# Patient Record
Sex: MALE | Race: WHITE | NOT HISPANIC OR LATINO | Employment: OTHER | URBAN - METROPOLITAN AREA
[De-identification: names, ages, dates, MRNs, and addresses within clinical notes are randomized per-mention and may not be internally consistent; named-entity substitution may affect disease eponyms.]

---

## 2022-02-20 ENCOUNTER — TELEPHONE (OUTPATIENT)
Dept: BEHAVIORAL HEALTH | Facility: CLINIC | Age: 72
End: 2022-02-20

## 2022-02-20 ENCOUNTER — HOSPITAL ENCOUNTER (INPATIENT)
Facility: CLINIC | Age: 72
LOS: 3 days | Discharge: PSYCHIATRIC OR SUBSTANCE USE FACILITY | DRG: 897 | End: 2022-02-23
Attending: EMERGENCY MEDICINE | Admitting: PSYCHIATRY & NEUROLOGY
Payer: MEDICARE

## 2022-02-20 DIAGNOSIS — F13.10 BENZODIAZEPINE ABUSE (H): ICD-10-CM

## 2022-02-20 DIAGNOSIS — F10.29 ALCOHOL DEPENDENCE WITH UNSPECIFIED ALCOHOL-INDUCED DISORDER (H): ICD-10-CM

## 2022-02-20 DIAGNOSIS — Z20.822 CONTACT WITH AND (SUSPECTED) EXPOSURE TO COVID-19: ICD-10-CM

## 2022-02-20 PROBLEM — F19.139 SUBSTANCE ABUSE WITHDRAWAL WITH COMPLICATION (H): Status: ACTIVE | Noted: 2022-02-20

## 2022-02-20 LAB
ALBUMIN SERPL-MCNC: 3.3 G/DL (ref 3.4–5)
ALBUMIN UR-MCNC: 30 MG/DL
ALP SERPL-CCNC: 108 U/L (ref 40–150)
ALT SERPL W P-5'-P-CCNC: 43 U/L (ref 0–70)
AMPHETAMINES UR QL SCN: ABNORMAL
ANION GAP SERPL CALCULATED.3IONS-SCNC: 5 MMOL/L (ref 3–14)
APPEARANCE UR: CLEAR
AST SERPL W P-5'-P-CCNC: 35 U/L (ref 0–45)
BARBITURATES UR QL: ABNORMAL
BASOPHILS # BLD AUTO: 0.1 10E3/UL (ref 0–0.2)
BASOPHILS NFR BLD AUTO: 1 %
BENZODIAZ UR QL: ABNORMAL
BILIRUB SERPL-MCNC: 0.6 MG/DL (ref 0.2–1.3)
BILIRUB UR QL STRIP: NEGATIVE
BUN SERPL-MCNC: 41 MG/DL (ref 7–30)
CALCIUM SERPL-MCNC: 9.4 MG/DL (ref 8.5–10.1)
CANNABINOIDS UR QL SCN: ABNORMAL
CHLORIDE BLD-SCNC: 107 MMOL/L (ref 94–109)
CO2 SERPL-SCNC: 28 MMOL/L (ref 20–32)
COCAINE UR QL: ABNORMAL
COLOR UR AUTO: ABNORMAL
CREAT SERPL-MCNC: 1.94 MG/DL (ref 0.66–1.25)
EOSINOPHIL # BLD AUTO: 0.1 10E3/UL (ref 0–0.7)
EOSINOPHIL NFR BLD AUTO: 2 %
ERYTHROCYTE [DISTWIDTH] IN BLOOD BY AUTOMATED COUNT: 11.6 % (ref 10–15)
ETHANOL SERPL-MCNC: <0.01 G/DL
GFR SERPL CREATININE-BSD FRML MDRD: 37 ML/MIN/1.73M2
GLUCOSE BLD-MCNC: 123 MG/DL (ref 70–99)
GLUCOSE UR STRIP-MCNC: NEGATIVE MG/DL
HCT VFR BLD AUTO: 40.7 % (ref 40–53)
HGB BLD-MCNC: 13.7 G/DL (ref 13.3–17.7)
HGB UR QL STRIP: ABNORMAL
HYALINE CASTS: 1 /LPF
IMM GRANULOCYTES # BLD: 0.1 10E3/UL
IMM GRANULOCYTES NFR BLD: 1 %
KETONES UR STRIP-MCNC: NEGATIVE MG/DL
LEUKOCYTE ESTERASE UR QL STRIP: NEGATIVE
LYMPHOCYTES # BLD AUTO: 1.2 10E3/UL (ref 0.8–5.3)
LYMPHOCYTES NFR BLD AUTO: 19 %
MCH RBC QN AUTO: 31.8 PG (ref 26.5–33)
MCHC RBC AUTO-ENTMCNC: 33.7 G/DL (ref 31.5–36.5)
MCV RBC AUTO: 94 FL (ref 78–100)
MONOCYTES # BLD AUTO: 0.7 10E3/UL (ref 0–1.3)
MONOCYTES NFR BLD AUTO: 11 %
MUCOUS THREADS #/AREA URNS LPF: PRESENT /LPF
NEUTROPHILS # BLD AUTO: 4.3 10E3/UL (ref 1.6–8.3)
NEUTROPHILS NFR BLD AUTO: 66 %
NITRATE UR QL: NEGATIVE
NRBC # BLD AUTO: 0 10E3/UL
NRBC BLD AUTO-RTO: 0 /100
OPIATES UR QL SCN: ABNORMAL
PH UR STRIP: 5.5 [PH] (ref 5–7)
PLATELET # BLD AUTO: 233 10E3/UL (ref 150–450)
POTASSIUM BLD-SCNC: 4 MMOL/L (ref 3.4–5.3)
PROT SERPL-MCNC: 7 G/DL (ref 6.8–8.8)
RBC # BLD AUTO: 4.31 10E6/UL (ref 4.4–5.9)
RBC URINE: <1 /HPF
SARS-COV-2 RNA RESP QL NAA+PROBE: NEGATIVE
SODIUM SERPL-SCNC: 140 MMOL/L (ref 133–144)
SP GR UR STRIP: 1.02 (ref 1–1.03)
SQUAMOUS EPITHELIAL: <1 /HPF
UROBILINOGEN UR STRIP-MCNC: NORMAL MG/DL
WBC # BLD AUTO: 6.4 10E3/UL (ref 4–11)
WBC URINE: <1 /HPF

## 2022-02-20 PROCEDURE — 80307 DRUG TEST PRSMV CHEM ANLYZR: CPT | Performed by: EMERGENCY MEDICINE

## 2022-02-20 PROCEDURE — 80053 COMPREHEN METABOLIC PANEL: CPT | Performed by: EMERGENCY MEDICINE

## 2022-02-20 PROCEDURE — 250N000013 HC RX MED GY IP 250 OP 250 PS 637: Performed by: PSYCHIATRY & NEUROLOGY

## 2022-02-20 PROCEDURE — 99284 EMERGENCY DEPT VISIT MOD MDM: CPT | Performed by: EMERGENCY MEDICINE

## 2022-02-20 PROCEDURE — 82077 ASSAY SPEC XCP UR&BREATH IA: CPT | Performed by: EMERGENCY MEDICINE

## 2022-02-20 PROCEDURE — C9803 HOPD COVID-19 SPEC COLLECT: HCPCS | Performed by: EMERGENCY MEDICINE

## 2022-02-20 PROCEDURE — 128N000004 HC R&B CD ADULT

## 2022-02-20 PROCEDURE — 87635 SARS-COV-2 COVID-19 AMP PRB: CPT | Performed by: EMERGENCY MEDICINE

## 2022-02-20 PROCEDURE — 99285 EMERGENCY DEPT VISIT HI MDM: CPT | Performed by: EMERGENCY MEDICINE

## 2022-02-20 PROCEDURE — HZ2ZZZZ DETOXIFICATION SERVICES FOR SUBSTANCE ABUSE TREATMENT: ICD-10-PCS | Performed by: EMERGENCY MEDICINE

## 2022-02-20 PROCEDURE — 36415 COLL VENOUS BLD VENIPUNCTURE: CPT | Performed by: EMERGENCY MEDICINE

## 2022-02-20 PROCEDURE — 81001 URINALYSIS AUTO W/SCOPE: CPT | Performed by: EMERGENCY MEDICINE

## 2022-02-20 PROCEDURE — 82040 ASSAY OF SERUM ALBUMIN: CPT | Performed by: EMERGENCY MEDICINE

## 2022-02-20 PROCEDURE — 85025 COMPLETE CBC W/AUTO DIFF WBC: CPT | Performed by: EMERGENCY MEDICINE

## 2022-02-20 RX ORDER — MULTIPLE VITAMINS W/ MINERALS TAB 9MG-400MCG
1 TAB ORAL DAILY
Status: DISCONTINUED | OUTPATIENT
Start: 2022-02-21 | End: 2022-02-23 | Stop reason: HOSPADM

## 2022-02-20 RX ORDER — CHOLECALCIFEROL (VITAMIN D3) 50 MCG
1 TABLET ORAL DAILY
Status: ON HOLD | COMMUNITY
End: 2022-02-23

## 2022-02-20 RX ORDER — PHENOBARBITAL 32.4 MG/1
32.4 TABLET ORAL 3 TIMES DAILY
Status: DISCONTINUED | OUTPATIENT
Start: 2022-02-21 | End: 2022-02-21

## 2022-02-20 RX ORDER — ATENOLOL 50 MG/1
50 TABLET ORAL DAILY PRN
Status: DISCONTINUED | OUTPATIENT
Start: 2022-02-20 | End: 2022-02-23 | Stop reason: HOSPADM

## 2022-02-20 RX ORDER — TAMSULOSIN HYDROCHLORIDE 0.4 MG/1
1.2 CAPSULE ORAL AT BEDTIME
Status: DISCONTINUED | OUTPATIENT
Start: 2022-02-20 | End: 2022-02-23 | Stop reason: HOSPADM

## 2022-02-20 RX ORDER — ROSUVASTATIN CALCIUM 20 MG/1
40 TABLET, COATED ORAL DAILY
Status: DISCONTINUED | OUTPATIENT
Start: 2022-02-21 | End: 2022-02-23 | Stop reason: HOSPADM

## 2022-02-20 RX ORDER — FUROSEMIDE 40 MG
40 TABLET ORAL DAILY
Status: DISCONTINUED | OUTPATIENT
Start: 2022-02-21 | End: 2022-02-23 | Stop reason: HOSPADM

## 2022-02-20 RX ORDER — METOPROLOL TARTRATE 25 MG/1
25 TABLET, FILM COATED ORAL 2 TIMES DAILY
Status: DISCONTINUED | OUTPATIENT
Start: 2022-02-20 | End: 2022-02-23 | Stop reason: HOSPADM

## 2022-02-20 RX ORDER — FINASTERIDE 5 MG/1
5 TABLET, FILM COATED ORAL DAILY
Status: ON HOLD | COMMUNITY
End: 2022-02-23

## 2022-02-20 RX ORDER — LOPERAMIDE HCL 2 MG
2 CAPSULE ORAL 4 TIMES DAILY PRN
Status: DISCONTINUED | OUTPATIENT
Start: 2022-02-20 | End: 2022-02-23 | Stop reason: HOSPADM

## 2022-02-20 RX ORDER — FUROSEMIDE 20 MG
40 TABLET ORAL DAILY
Status: ON HOLD | COMMUNITY
Start: 2021-09-15 | End: 2022-02-23

## 2022-02-20 RX ORDER — ROSUVASTATIN CALCIUM 40 MG/1
40 TABLET, COATED ORAL DAILY
Status: ON HOLD | COMMUNITY
End: 2022-02-23

## 2022-02-20 RX ORDER — PHENOBARBITAL 32.4 MG/1
32.4 TABLET ORAL ONCE
Status: COMPLETED | OUTPATIENT
Start: 2022-02-20 | End: 2022-02-20

## 2022-02-20 RX ORDER — VITAMIN B COMPLEX
50 TABLET ORAL DAILY
Status: DISCONTINUED | OUTPATIENT
Start: 2022-02-21 | End: 2022-02-23 | Stop reason: HOSPADM

## 2022-02-20 RX ORDER — HYDROXYZINE HYDROCHLORIDE 25 MG/1
25 TABLET, FILM COATED ORAL EVERY 4 HOURS PRN
Status: DISCONTINUED | OUTPATIENT
Start: 2022-02-20 | End: 2022-02-23 | Stop reason: HOSPADM

## 2022-02-20 RX ORDER — ASPIRIN 81 MG/1
81 TABLET ORAL DAILY
Status: DISCONTINUED | OUTPATIENT
Start: 2022-02-21 | End: 2022-02-23 | Stop reason: HOSPADM

## 2022-02-20 RX ORDER — ACETAMINOPHEN 325 MG/1
650 TABLET ORAL EVERY 4 HOURS PRN
Status: DISCONTINUED | OUTPATIENT
Start: 2022-02-20 | End: 2022-02-23 | Stop reason: HOSPADM

## 2022-02-20 RX ORDER — AMOXICILLIN 250 MG
1 CAPSULE ORAL 2 TIMES DAILY PRN
Status: DISCONTINUED | OUTPATIENT
Start: 2022-02-20 | End: 2022-02-23 | Stop reason: HOSPADM

## 2022-02-20 RX ORDER — TAMSULOSIN HYDROCHLORIDE 0.4 MG/1
1.2 CAPSULE ORAL AT BEDTIME
Status: ON HOLD | COMMUNITY
End: 2022-02-23

## 2022-02-20 RX ORDER — POLYETHYLENE GLYCOL 3350 17 G
2-4 POWDER IN PACKET (EA) ORAL
Status: DISCONTINUED | OUTPATIENT
Start: 2022-02-20 | End: 2022-02-23 | Stop reason: HOSPADM

## 2022-02-20 RX ORDER — ONDANSETRON 4 MG/1
4 TABLET, ORALLY DISINTEGRATING ORAL EVERY 6 HOURS PRN
Status: DISCONTINUED | OUTPATIENT
Start: 2022-02-20 | End: 2022-02-23 | Stop reason: HOSPADM

## 2022-02-20 RX ORDER — TEMAZEPAM 15 MG/1
15 CAPSULE ORAL
Status: ON HOLD | COMMUNITY
End: 2022-02-23

## 2022-02-20 RX ORDER — METOPROLOL TARTRATE 25 MG/1
25 TABLET, FILM COATED ORAL 2 TIMES DAILY
Status: ON HOLD | COMMUNITY
End: 2022-02-23

## 2022-02-20 RX ORDER — FOLIC ACID 1 MG/1
1 TABLET ORAL DAILY
Status: DISCONTINUED | OUTPATIENT
Start: 2022-02-21 | End: 2022-02-23 | Stop reason: HOSPADM

## 2022-02-20 RX ORDER — MAGNESIUM HYDROXIDE/ALUMINUM HYDROXICE/SIMETHICONE 120; 1200; 1200 MG/30ML; MG/30ML; MG/30ML
30 SUSPENSION ORAL EVERY 4 HOURS PRN
Status: DISCONTINUED | OUTPATIENT
Start: 2022-02-20 | End: 2022-02-23 | Stop reason: HOSPADM

## 2022-02-20 RX ORDER — SERTRALINE HYDROCHLORIDE 100 MG/1
150 TABLET, FILM COATED ORAL DAILY
Status: ON HOLD | COMMUNITY
End: 2022-02-23

## 2022-02-20 RX ORDER — FINASTERIDE 5 MG/1
5 TABLET, FILM COATED ORAL DAILY
Status: DISCONTINUED | OUTPATIENT
Start: 2022-02-21 | End: 2022-02-23 | Stop reason: HOSPADM

## 2022-02-20 RX ORDER — LORAZEPAM 1 MG/1
1-4 TABLET ORAL EVERY 30 MIN PRN
Status: DISCONTINUED | OUTPATIENT
Start: 2022-02-20 | End: 2022-02-23 | Stop reason: HOSPADM

## 2022-02-20 RX ADMIN — PHENOBARBITAL 32.4 MG: 32.4 TABLET ORAL at 23:25

## 2022-02-20 RX ADMIN — TAMSULOSIN HYDROCHLORIDE 1.2 MG: 0.4 CAPSULE ORAL at 23:25

## 2022-02-20 RX ADMIN — Medication 10 MG: at 23:25

## 2022-02-20 RX ADMIN — LORAZEPAM 1 MG: 1 TABLET ORAL at 23:25

## 2022-02-20 RX ADMIN — METOPROLOL TARTRATE 25 MG: 25 TABLET, FILM COATED ORAL at 23:25

## 2022-02-20 ASSESSMENT — ENCOUNTER SYMPTOMS
SHORTNESS OF BREATH: 0
FEVER: 0
COUGH: 0
TREMORS: 1

## 2022-02-20 NOTE — ED PROVIDER NOTES
[facility] EMERGENCY DEPARTMENT ([facility])    2/20/22     hallway 5   History     Chief Complaint   Patient presents with     Alcohol Intoxication     Last drink last night.  10 wiskey per day.  No seizures.  No drugs.  Pt states they got him hooked on Tamazepam 6 years ago     The history is provided by the patient and medical records.     Erwin Devlin is a 71 year old male with history of heart disease status post stenting x 2, hypertension, kidney disease, prior neck surgery who presents with request for detox.  Patient last drank last night.  He has been drinking 10-12 oz of whiskey a day, last drank last night. He hasn't stopped drinking long enough to tell if he develops withdrawal symptoms. He feels alright at this time, is starting to shake a little bit. No nausea or vomiting. He s otherwise healthy. No suicidal or homicidal ideation. No mental health concerns. No cough, fever or other COVID-19 symptoms. No chest pain or shortness of breath. No history of alcohol withdrawal seizures or DTs.  He also takes temazepam, last dose last night. He takes it nightly for sleep. He has been on temaz for the past 6 years. He states he was started after his neck surgery. He has had withdrawal symptoms from stopping temaz, feelz buzzy and on edge. The longest he has been without temaz is 1 day.   He denies history of alcohol withdrawal seizures.  Denies any other substance use. Family states they did call detox, was told that detox beds were on a first come first serve basis. No prior attmpt detox.    Per [facility] records, patient has had 3 doses of the Moderna COVID-19 vaccine, last dose 12/2/21.     Past Medical History  Past Medical History:   Diagnosis Date     Anxiety      Chronic kidney disease     Decrease kidney function with CR level elevated     Hypertension      Myocardial infarction (H)      Substance abuse (H)      Past Surgical History:   Procedure Laterality Date     CARDIAC SURGERY      2  "stents     CERVICAL SPINE SURGERY      Fusion between C5 and C6.  Artificial disc between C6-C7     GENITOURINARY SURGERY      Abcess removed from bladder     HERNIA REPAIR       VASCULAR SURGERY      Vericose vein stripping     aspirin (ASA) 81 MG EC tablet  finasteride (PROSCAR) 5 MG tablet  furosemide (LASIX) 20 MG tablet  metoprolol tartrate (LOPRESSOR) 25 MG tablet  sertraline (ZOLOFT) 100 MG tablet  tamsulosin (FLOMAX) 0.4 MG capsule  temazepam (RESTORIL) 15 MG capsule  vitamin D3 (CHOLECALCIFEROL) 50 mcg (2000 units) tablet  rosuvastatin (CRESTOR) 40 MG tablet      Allergies   Allergen Reactions     Other Environmental Allergy Rash     Paper tape gives him blisters      Penicillins      Other reaction(s): Unknown Reaction  Doesn't know the reaction because he was an infant       Family History  No family history on file.  Social History   Social History     Tobacco Use     Smoking status: Former Smoker     Smokeless tobacco: Former User   Substance Use Topics     Alcohol use: Yes     Drug use: Never      Past medical history, past surgical history, medications, allergies, family history, and social history were reviewed with the patient. No additional pertinent items.       Review of Systems   Constitutional: Negative for fever.   Respiratory: Negative for cough and shortness of breath.    Neurological: Positive for tremors.   Psychiatric/Behavioral: Negative for suicidal ideas.   All other systems reviewed and are negative.    A complete review of systems was performed with pertinent positives and negatives noted in the HPI, and all other systems negative.    Physical Exam   BP: 133/83  Pulse: 102  Temp: 98.1  F (36.7  C)  Resp: 18  Height: 177.8 cm (5' 10\")  Weight: 104.2 kg (229 lb 12.8 oz)  SpO2: 94 %  Physical Exam  General: awake, alert, NAD  Head: normal cephalic  HEENT: pupils equal, conjugate gaze intact  Neck: Supple  CV: regular rate and rhythm without murmur  Lungs: clear to auscultation  Abd: " soft, non-tender, no guarding, no peritoneal signs  EXT: lower extremities without swelling or edema  Neuro: awake, answers questions appropriately.  mild tremors of the bilateral upper extremities when held out straight otherwise normal neurologic deficits.  Psych: He is awake alert no acute distress.  Makes good eye contact, appears well-groomed, denies any suicidal ideation     ED Course      Procedures       The medical record was reviewed and interpreted.  Current labs reviewed and interpreted.  Previous labs reviewed and interpreted.              Results for orders placed or performed during the hospital encounter of 02/20/22   Comprehensive metabolic panel     Status: Abnormal   Result Value Ref Range    Sodium 140 133 - 144 mmol/L    Potassium 4.0 3.4 - 5.3 mmol/L    Chloride 107 94 - 109 mmol/L    Carbon Dioxide (CO2) 28 20 - 32 mmol/L    Anion Gap 5 3 - 14 mmol/L    Urea Nitrogen 41 (H) 7 - 30 mg/dL    Creatinine 1.94 (H) 0.66 - 1.25 mg/dL    Calcium 9.4 8.5 - 10.1 mg/dL    Glucose 123 (H) 70 - 99 mg/dL    Alkaline Phosphatase 108 40 - 150 U/L    AST 35 0 - 45 U/L    ALT 43 0 - 70 U/L    Protein Total 7.0 6.8 - 8.8 g/dL    Albumin 3.3 (L) 3.4 - 5.0 g/dL    Bilirubin Total 0.6 0.2 - 1.3 mg/dL    GFR Estimate 37 (L) >60 mL/min/1.73m2   Asymptomatic COVID-19 Virus (Coronavirus) by PCR Nasopharyngeal     Status: Normal    Specimen: Nasopharyngeal; Swab   Result Value Ref Range    SARS CoV2 PCR Negative Negative    Narrative    Testing was performed using the carrie  SARS-CoV-2 & Influenza A/B Assay on the carrie  Ness  System.  This test should be ordered for the detection of SARS-COV-2 in individuals who meet SARS-CoV-2 clinical and/or epidemiological criteria. Test performance is unknown in asymptomatic patients.  This test is for in vitro diagnostic use under the FDA EUA for laboratories certified under CLIA to perform moderate and/or high complexity testing. This test has not been FDA cleared or approved.  A  negative test does not rule out the presence of PCR inhibitors in the specimen or target RNA in concentration below the limit of detection for the assay. The possibility of a false negative should be considered if the patient's recent exposure or clinical presentation suggests COVID-19.  [facility] are certified under the Clinical Laboratory Improvement Amendments of 1988 (CLIA-88) as qualified to perform moderate and/or high complexity laboratory testing.   UA with Microscopic reflex to Culture     Status: Abnormal    Specimen: Urine, Clean Catch   Result Value Ref Range    Color Urine Light Yellow Colorless, Straw, Light Yellow, Yellow    Appearance Urine Clear Clear    Glucose Urine Negative Negative mg/dL    Bilirubin Urine Negative Negative    Ketones Urine Negative Negative mg/dL    Specific Gravity Urine 1.016 1.003 - 1.035    Blood Urine Trace (A) Negative    pH Urine 5.5 5.0 - 7.0    Protein Albumin Urine 30  (A) Negative mg/dL    Urobilinogen Urine Normal Normal, 2.0 mg/dL    Nitrite Urine Negative Negative    Leukocyte Esterase Urine Negative Negative    Mucus Urine Present (A) None Seen /LPF    RBC Urine <1 <=2 /HPF    WBC Urine <1 <=5 /HPF    Squamous Epithelials Urine <1 <=1 /HPF    Hyaline Casts Urine 1 <=2 /LPF    Narrative    Urine Culture not indicated   CBC with platelets and differential     Status: Abnormal   Result Value Ref Range    WBC Count 6.4 4.0 - 11.0 10e3/uL    RBC Count 4.31 (L) 4.40 - 5.90 10e6/uL    Hemoglobin 13.7 13.3 - 17.7 g/dL    Hematocrit 40.7 40.0 - 53.0 %    MCV 94 78 - 100 fL    MCH 31.8 26.5 - 33.0 pg    MCHC 33.7 31.5 - 36.5 g/dL    RDW 11.6 10.0 - 15.0 %    Platelet Count 233 150 - 450 10e3/uL    % Neutrophils 66 %    % Lymphocytes 19 %    % Monocytes 11 %    % Eosinophils 2 %    % Basophils 1 %    % Immature Granulocytes 1 %    NRBCs per 100 WBC 0 <1 /100    Absolute Neutrophils 4.3 1.6 - 8.3 10e3/uL    Absolute Lymphocytes 1.2 0.8 - 5.3 10e3/uL     Absolute Monocytes 0.7 0.0 - 1.3 10e3/uL    Absolute Eosinophils 0.1 0.0 - 0.7 10e3/uL    Absolute Basophils 0.1 0.0 - 0.2 10e3/uL    Absolute Immature Granulocytes 0.1 <=0.4 10e3/uL    Absolute NRBCs 0.0 10e3/uL   Drug abuse screen 1 urine (ED)     Status: Abnormal   Result Value Ref Range    Amphetamines Urine Screen Negative Screen Negative    Barbiturates Urine Screen Negative Screen Negative    Benzodiazepines Urine Screen Positive (A) Screen Negative    Cannabinoids Urine Screen Negative Screen Negative    Cocaine Urine Screen Negative Screen Negative    Opiates Urine Screen Negative Screen Negative   Alcohol level blood     Status: Normal   Result Value Ref Range    Alcohol ethyl <0.01 <=0.01 g/dL   CBC with platelets differential     Status: Abnormal    Narrative    The following orders were created for panel order CBC with platelets differential.  Procedure                               Abnormality         Status                     ---------                               -----------         ------                     CBC with platelets and d...[[id number]]  Abnormal            Final result                 Please view results for these tests on the individual orders.   Urine Drugs of Abuse Screen     Status: Abnormal    Narrative    The following orders were created for panel order Urine Drugs of Abuse Screen.  Procedure                               Abnormality         Status                     ---------                               -----------         ------                     Drug abuse screen 1 urin...[[id number]]  Abnormal            Final result                 Please view results for these tests on the individual orders.     Medications - No data to display     Assessments & Plan (with Medical Decision Making)   Erwin Devlin is a 71 year old male who presents to the emergency department seeking detox from alcohol and benzodiazepines. Patient also endorses no history of alcohol withdrawal  though states he  is unsure that he is ever stopped in order to have withdrawal symptoms.  He does note that he feels withdrawal symptoms if he stops his benzos for even a single day.    Here patient denies any new or acute complaints such as URI symptoms, chest pain, or shortness of breath.  Patient also denies any acute alcohol withdrawal symptoms but last used earlier today.    Plan is to monitor for alcohol withdrawal symptoms, will place on the [facility] protocol.    Medical screening labs were obtained to facilitate admission to the detox unit.  This includes CBC, BMP, covid and drug screen.    Patient's laboratory studies are unremarkable including negative covid.  He does have an elevated creatinine but this appears to be consistent with previous levels per chart review and consistent with his known CKD.  Patient was monitored for withdrawal symptoms while in the emergency department until he can be admitted to a detox bed.  No acute events while in the emergency department.      I have reviewed the nursing notes. I have reviewed the findings, diagnosis, plan and need for follow up with the patient.    New Prescriptions    No medications on file       Final diagnoses:   Alcohol dependence with unspecified alcohol-induced disorder (H)   Benzodiazepine abuse (H)     I, Viviane Osuna, am serving as a trained medical scribe to document services personally performed by Jayme Little MD based on the provider's statements to me on February 20, 2022.  This document has been checked and approved by the attending provider.    IJayme MD, was physically present and have reviewed and verified the accuracy of this note documented by Viviane Osuna, medical scribe.      Jayme Little MD       [facility] EMERGENCY DEPARTMENT  2/20/2022     Jayme Little MD  02/20/22 1906

## 2022-02-20 NOTE — TELEPHONE ENCOUNTER
A ER dr called to give clinical on 71/M in [place] [facility]    B Patient reports last drink last night, breathalyzer 0; reports drinking 10-12 shots of whiskey a day, drinking for years. Patient currently shaky, no HX of withdrawal seizures or DT's. Pt RX'd Temazepam for neck surgery 6 years ago and has been using it nightly for sleep 15MG , last use last night, longest without it is one day and experiences withdrawal symptoms of feeling buzzy and on edge . No withdrawal seizures or dt's, HX of heart disease, hypertension,kidney disease no mental health concerns. Ambulatory, eating, drinking.     A Vol    R In [place] [facility] awaiting detox placement.     Patient cleared and ready for behavioral bed placement: Yes     7:35pm: Paged on call provider for 3a/CD/Nurys  7:43pm: On call provider accepts for 3a/Nurys/CD  8:06pm: Intake informed unit charge nurse of patient placement and report time, charge will call er when ready for report.

## 2022-02-21 LAB
CHOLEST SERPL-MCNC: 146 MG/DL
GGT SERPL-CCNC: 114 U/L (ref 0–75)
HDLC SERPL-MCNC: 49 MG/DL
HOLD SPECIMEN: NORMAL
LDLC SERPL CALC-MCNC: 52 MG/DL
NONHDLC SERPL-MCNC: 97 MG/DL
TRIGL SERPL-MCNC: 224 MG/DL
TSH SERPL DL<=0.005 MIU/L-ACNC: 3.68 MU/L (ref 0.4–4)

## 2022-02-21 PROCEDURE — 82977 ASSAY OF GGT: CPT | Performed by: PSYCHIATRY & NEUROLOGY

## 2022-02-21 PROCEDURE — 250N000013 HC RX MED GY IP 250 OP 250 PS 637: Performed by: PSYCHIATRY & NEUROLOGY

## 2022-02-21 PROCEDURE — G0177 OPPS/PHP; TRAIN & EDUC SERV: HCPCS

## 2022-02-21 PROCEDURE — 80061 LIPID PANEL: CPT | Performed by: PSYCHIATRY & NEUROLOGY

## 2022-02-21 PROCEDURE — 99232 SBSQ HOSP IP/OBS MODERATE 35: CPT | Performed by: PHYSICIAN ASSISTANT

## 2022-02-21 PROCEDURE — 128N000004 HC R&B CD ADULT

## 2022-02-21 PROCEDURE — 99207 PR CONSULT E&M CHANGED TO SUBSEQUENT LEVEL: CPT | Performed by: PHYSICIAN ASSISTANT

## 2022-02-21 PROCEDURE — 250N000013 HC RX MED GY IP 250 OP 250 PS 637: Performed by: PHYSICIAN ASSISTANT

## 2022-02-21 PROCEDURE — 36415 COLL VENOUS BLD VENIPUNCTURE: CPT | Performed by: PSYCHIATRY & NEUROLOGY

## 2022-02-21 PROCEDURE — 99223 1ST HOSP IP/OBS HIGH 75: CPT | Mod: AI | Performed by: PSYCHIATRY & NEUROLOGY

## 2022-02-21 PROCEDURE — 84443 ASSAY THYROID STIM HORMONE: CPT | Performed by: PSYCHIATRY & NEUROLOGY

## 2022-02-21 RX ORDER — PHENOBARBITAL 32.4 MG/1
32.4 TABLET ORAL AT BEDTIME
Status: DISCONTINUED | OUTPATIENT
Start: 2022-02-21 | End: 2022-02-23 | Stop reason: HOSPADM

## 2022-02-21 RX ADMIN — ASPIRIN 81 MG: 81 TABLET, COATED ORAL at 08:54

## 2022-02-21 RX ADMIN — THIAMINE HCL TAB 100 MG 100 MG: 100 TAB at 08:55

## 2022-02-21 RX ADMIN — FINASTERIDE 5 MG: 5 TABLET, FILM COATED ORAL at 08:55

## 2022-02-21 RX ADMIN — PHENOBARBITAL 32.4 MG: 32.4 TABLET ORAL at 20:58

## 2022-02-21 RX ADMIN — METOPROLOL TARTRATE 25 MG: 25 TABLET, FILM COATED ORAL at 20:58

## 2022-02-21 RX ADMIN — MULTIPLE VITAMINS W/ MINERALS TAB 1 TABLET: TAB at 08:54

## 2022-02-21 RX ADMIN — METOPROLOL TARTRATE 25 MG: 25 TABLET, FILM COATED ORAL at 08:54

## 2022-02-21 RX ADMIN — Medication 10 MG: at 20:57

## 2022-02-21 RX ADMIN — FOLIC ACID 1 MG: 1 TABLET ORAL at 08:54

## 2022-02-21 RX ADMIN — Medication 50 MCG: at 08:54

## 2022-02-21 RX ADMIN — FUROSEMIDE 40 MG: 40 TABLET ORAL at 08:55

## 2022-02-21 RX ADMIN — PHENOBARBITAL 32.4 MG: 32.4 TABLET ORAL at 08:54

## 2022-02-21 RX ADMIN — SERTRALINE HYDROCHLORIDE 150 MG: 50 TABLET ORAL at 08:55

## 2022-02-21 RX ADMIN — TAMSULOSIN HYDROCHLORIDE 1.2 MG: 0.4 CAPSULE ORAL at 20:58

## 2022-02-21 RX ADMIN — LORAZEPAM 1 MG: 1 TABLET ORAL at 05:04

## 2022-02-21 RX ADMIN — ROSUVASTATIN CALCIUM 40 MG: 20 TABLET, FILM COATED ORAL at 08:54

## 2022-02-21 RX ADMIN — NICOTINE POLACRILEX 2 MG: 2 LOZENGE ORAL at 12:30

## 2022-02-21 RX ADMIN — NICOTINE POLACRILEX 4 MG: 2 LOZENGE ORAL at 18:13

## 2022-02-21 ASSESSMENT — ACTIVITIES OF DAILY LIVING (ADL)
HYGIENE/GROOMING: INDEPENDENT
DRESS: INDEPENDENT
ORAL_HYGIENE: INDEPENDENT

## 2022-02-21 NOTE — PROGRESS NOTES
MSSA scores of 6 and 8. 1 mg Lorazepam given at 0504. Pt appeared to be sleeping comfortably. No concerns reported or noted during the night.

## 2022-02-21 NOTE — PLAN OF CARE
"Goal Outcome Evaluation:  Pt continues to be monitored for benzo and ETOH withdrawal, MSSA scores this shift 6 and 4.  Pt up for meals ate 100% of meal, pt med compliant. Blood pressure elevated this morning, continues to decrease from previous shifts.  Last Ativan 0504 2/21.     Patient alert and oriented to person, place, and time, adequately groomed. Pt is pleasant, full range affect, stated he is \"hopeful\", good eye contact, cooperative, medication compliant. PRN dustin lozenge given. Pt on falls (wrist band present) and withdrawal precautions, no withdrawal symptoms / behaviors observed. Pt contracts for safety. Pt engaged in group activities, observed unit milieu, reported he did not sleep well, napped/rested in room 3 hours, appropriate with peers and staff. Pt communicates and verbalizes needs to staff. No behaviors noted. Will continue to monitor behavior and encourage engagement.   Plan is to go to the [facility] treatment [facility].    NURSING ASSESSMENT  Pain: denies  Anxiety: denies  Depression: denies  SI: denies  SIB: denies  HI: denies  AVH: denies, did not appear to be responding to internal stimuli, did not demonstrate delusional thinking.  Mood/Affect: full range affect  Sleep: \"not very good\"  Medication: compliant, no side effects reported   PRN: nicotine lozenge  Appetite: breakfast  100%    Lunch 100%  ADLs: independent   Visits: none  Vitals: WNL   Medical: denies pain, BP elevated    Problem: Plan of Care - These are the overarching goals to be used throughout the patient stay.    Goal: Plan of Care Review/Shift Note  Description: The Plan of Care Review/Shift note should be completed every shift.  The Outcome Evaluation is a brief statement about your assessment that the patient is improving, declining, or no change.  This information will be displayed automatically on your shift note.  Outcome: Ongoing, Progressing     Problem: Plan of Care - These are the overarching goals to be used throughout " the patient stay.    Goal: Optimal Comfort and Wellbeing  Outcome: Ongoing, Progressing     Problem: Behavioral Health Plan of Care  Goal: Adheres to Safety Considerations for Self and Others  Outcome: Ongoing, Progressing     Problem: Substance Withdrawal  Goal: Substance Withdrawal  Description: Signs and symptoms of listed problems will be absent or manageable.  Outcome: Ongoing, Progressing     Problem: Substance Withdrawal  Goal: Social and Therapeutic (Substance Withdrawal)  Description: Signs and symptoms of listed problems will be absent or manageable.  Outcome: Ongoing, Progressing

## 2022-02-21 NOTE — H&P
Erwin Devlin is a 71 year oldHistory was provided by HERACLIO who was a fair historian.   CHIEF COMPLAINT: Alcohol    HISTORY OF PRESENT ILLNESS:    Patient is a 71-year-old  male.  He has a complex medical history he has heart attack status post stenting hypertension kidney disease status post neck surgery.  Patient came to the emergency room after his family did an intervention he has been hiding his drinking from his family.  He is not able to function he has had falls he is gained 60 pounds he is not doing any family activities.  Alcohol is his drug of choice.  Patient has been using the following substances:   Started at age 17, became a problem at 20s  He was sober at age of 38 by going to treatment.  He relapsed 5 years ago since then he has been drinking 10 to 12 ounces of whiskey daily  Patient has tolerance, withdrawal, progressive use, loss of control, spending more time and more amount than intended. Patient has made attempts to quit, is experiencing cravings, and reports negative consequences.      He has no history of DTs or seizures      He has been taking temazepam 15 mg for the past 6 years               Denies thoughts of suicide or harming others.      Denies auditory or visual hallucinations.     Patient smokes 0 cigarettes    Patient denied any gambling    Substance Age first use First became regular or problematic Most recent use   Alcohol         Cannabis  none     Cocaine NONE       Stimulants NONE       Opioids NONE       Sedatives        Hallucinogens NONE       Inhalants NONE       Other         OTC drugs NONE       Nicotine           PSYCHIATRIC REVIEW OF SYSTEMS:         Psychiatric Review of Systems:   Depression:     Denies: depressed mood, suicidal ideation, decreased interest, changes in sleep, changes in appetite, guilt, hopelessness, helplessness, impaired concentration, decreased energy, irritability.  Alexia:     Denies: sleeplessness, increased goal-directed  activities, abrupt increase in energy, pressured speech   impulsiveness, racing thoughts, increased goal-directed activities, pressured speech, increase in energy  Alexia Feeling euphoric,Distractible,Impulsive,Grandiose,Talking excessively,Have energy without sleeping,Mood swings,Irritability  Psychosis:   .  Denies: visual hallucinations, auditory hallucinations, paranoia  Anxiety:    Denied excessive worries that are difficult to control for the past 6 months,   Chronic anxiety , not able to stop worrying impacting sleep, poor conc, irritable , muscle tension fatigue    Denied panic attacks  Pt has following s/o of anxiety  Palpitation pounding heart trembling shaking shortness of breath feeling of choking chest pain nausea feeling dizzy chills numbness derealization fear of dying fear of losing control    Come out of the blue    Denies: worries that are difficult to control for the past 6 months, panic attacks    Social anxiety disorder  Denies: Marked anxiety about 1 or more social situations in which patient is exposed to scrutiny ofothers and patient fears patient will act in the way that patient that will be negatively  causes significant impairment  Patient is afraid of being judged scrutinized  Patient avoids social situations  PTSD:   Denies: re-experiencing past trauma, nightmares, increased arousal, avoidance of traumatic stimuli, impaired function.  OCD:     Denies: obsessions, checking, symmetry, cleaning, skin picking.  ED:     Denies: restriction, binging, purging.         patient denies :symptoms of attention deficit disorder include a failure to pay attention to detail, a pattern of careless mistakes, a pattern of inattentive listening, a failure to follow through with projects, poor personal organization, losing necessary objects, distractibility, forgetfulness.    Denied symptoms of borderline personality disorder include a fear of abandonment, unstable self-image, impulsive behavior, affective  instability due to marked reactivity and mood lasting hours dissociative feeling, intense anger, unstable personal relationships, chronic feelings of boredom, periods of intense depressed mood.  Transient stressed related paranoid ideation severe dissociative symptoms              PSYCHIATRIC HISTORY     Previous diagnoses:     1 CD treatment when he was 38 no psychiatric hospitalizations    Past court commitments: none  SIB /SUICIDE ATTEMPTS NONE  Psych Hosp : None  Outpatient Programs none  Inpatient cd trt 1  Out pt cd trt none  Detoxes 1  PAST PSYCH MED TRIALS      SOCIAL HISTORY                                                                             Patient is   Patient has 2 children  Patient is retired   Patient's housing is with his family        Family History:   FAMILY HISTORY: No family history on file.  Family Mental Health History-  none    Substance Use Problems - present for alcoholism in his father             PTA Medications:     Medications Prior to Admission   Medication Sig Dispense Refill Last Dose     aspirin (ASA) 81 MG EC tablet Take 81 mg by mouth daily   2/19/2022     finasteride (PROSCAR) 5 MG tablet Take 5 mg by mouth daily   2/18/2022     furosemide (LASIX) 20 MG tablet Take 40 mg by mouth daily   2/18/2022     hydrocortisone-pramoxine (PROCTOFOAM-HC) rectal foam Place 1 Applicatorful rectally 3 times daily as needed   PRN     metoprolol tartrate (LOPRESSOR) 25 MG tablet Take 25 mg by mouth 2 times daily   2/19/2022     rosuvastatin (CRESTOR) 40 MG tablet Take 40 mg by mouth daily   2/18/2022     sertraline (ZOLOFT) 100 MG tablet Take 150 mg by mouth daily   2/18/2022     tamsulosin (FLOMAX) 0.4 MG capsule Take 1.2 mg by mouth At Bedtime   2/18/2022     temazepam (RESTORIL) 15 MG capsule Take 15 mg by mouth nightly as needed for sleep   PRN     vitamin D3 (CHOLECALCIFEROL) 50 mcg (2000 units) tablet Take 1 tablet by mouth daily   2/18/2022          Allergies:      Allergies   Allergen Reactions     Other Environmental Allergy Rash     Paper tape gives him blisters      Penicillins      Other reaction(s): Unknown Reaction  Doesn't know the reaction because he was an infant            Labs:     Recent Results (from the past 48 hour(s))   Comprehensive metabolic panel    Collection Time: 02/20/22  4:01 PM   Result Value Ref Range    Sodium 140 133 - 144 mmol/L    Potassium 4.0 3.4 - 5.3 mmol/L    Chloride 107 94 - 109 mmol/L    Carbon Dioxide (CO2) 28 20 - 32 mmol/L    Anion Gap 5 3 - 14 mmol/L    Urea Nitrogen 41 (H) 7 - 30 mg/dL    Creatinine 1.94 (H) 0.66 - 1.25 mg/dL    Calcium 9.4 8.5 - 10.1 mg/dL    Glucose 123 (H) 70 - 99 mg/dL    Alkaline Phosphatase 108 40 - 150 U/L    AST 35 0 - 45 U/L    ALT 43 0 - 70 U/L    Protein Total 7.0 6.8 - 8.8 g/dL    Albumin 3.3 (L) 3.4 - 5.0 g/dL    Bilirubin Total 0.6 0.2 - 1.3 mg/dL    GFR Estimate 37 (L) >60 mL/min/1.73m2   Asymptomatic COVID-19 Virus (Coronavirus) by PCR Nasopharyngeal    Collection Time: 02/20/22  4:01 PM    Specimen: Nasopharyngeal; Swab   Result Value Ref Range    SARS CoV2 PCR Negative Negative   CBC with platelets and differential    Collection Time: 02/20/22  4:01 PM   Result Value Ref Range    WBC Count 6.4 4.0 - 11.0 10e3/uL    RBC Count 4.31 (L) 4.40 - 5.90 10e6/uL    Hemoglobin 13.7 13.3 - 17.7 g/dL    Hematocrit 40.7 40.0 - 53.0 %    MCV 94 78 - 100 fL    MCH 31.8 26.5 - 33.0 pg    MCHC 33.7 31.5 - 36.5 g/dL    RDW 11.6 10.0 - 15.0 %    Platelet Count 233 150 - 450 10e3/uL    % Neutrophils 66 %    % Lymphocytes 19 %    % Monocytes 11 %    % Eosinophils 2 %    % Basophils 1 %    % Immature Granulocytes 1 %    NRBCs per 100 WBC 0 <1 /100    Absolute Neutrophils 4.3 1.6 - 8.3 10e3/uL    Absolute Lymphocytes 1.2 0.8 - 5.3 10e3/uL    Absolute Monocytes 0.7 0.0 - 1.3 10e3/uL    Absolute Eosinophils 0.1 0.0 - 0.7 10e3/uL    Absolute Basophils 0.1 0.0 - 0.2 10e3/uL    Absolute Immature Granulocytes 0.1 <=0.4  "10e3/uL    Absolute NRBCs 0.0 10e3/uL   Alcohol level blood    Collection Time: 02/20/22  4:01 PM   Result Value Ref Range    Alcohol ethyl <0.01 <=0.01 g/dL   Extra Red Top Tube    Collection Time: 02/20/22  4:01 PM   Result Value Ref Range    Hold Specimen JIC    UA with Microscopic reflex to Culture    Collection Time: 02/20/22  4:02 PM    Specimen: Urine, Clean Catch   Result Value Ref Range    Color Urine Light Yellow Colorless, Straw, Light Yellow, Yellow    Appearance Urine Clear Clear    Glucose Urine Negative Negative mg/dL    Bilirubin Urine Negative Negative    Ketones Urine Negative Negative mg/dL    Specific Gravity Urine 1.016 1.003 - 1.035    Blood Urine Trace (A) Negative    pH Urine 5.5 5.0 - 7.0    Protein Albumin Urine 30  (A) Negative mg/dL    Urobilinogen Urine Normal Normal, 2.0 mg/dL    Nitrite Urine Negative Negative    Leukocyte Esterase Urine Negative Negative    Mucus Urine Present (A) None Seen /LPF    RBC Urine <1 <=2 /HPF    WBC Urine <1 <=5 /HPF    Squamous Epithelials Urine <1 <=1 /HPF    Hyaline Casts Urine 1 <=2 /LPF   Drug abuse screen 1 urine (ED)    Collection Time: 02/20/22  4:02 PM   Result Value Ref Range    Amphetamines Urine Screen Negative Screen Negative    Barbiturates Urine Screen Negative Screen Negative    Benzodiazepines Urine Screen Positive (A) Screen Negative    Cannabinoids Urine Screen Negative Screen Negative    Cocaine Urine Screen Negative Screen Negative    Opiates Urine Screen Negative Screen Negative   GGT    Collection Time: 02/21/22  8:00 AM   Result Value Ref Range     (H) 0 - 75 U/L         BP (!) 168/98 (BP Location: Left arm)   Pulse 80   Temp 96.9  F (36.1  C) (Temporal)   Resp 16   Ht 1.778 m (5' 10\")   Wt 103.9 kg (229 lb)   SpO2 96%   BMI 32.86 kg/m    Weight is 229 lbs 0 oz  Body mass index is 32.86 kg/m .    Physical Exam:     ROS: 10 point ROS neg other than the symptoms noted above in the HPI.            Past Medical History: "   PAST MEDICAL HISTORY:   Past Medical History:   Diagnosis Date     Anxiety      Chronic kidney disease     Decrease kidney function with CR level elevated     Hypertension      Myocardial infarction (H)      Substance abuse (H)        PAST SURGICAL HISTORY:   Past Surgical History:   Procedure Laterality Date     CARDIAC SURGERY      2 stents     CERVICAL SPINE SURGERY      Fusion between C5 and C6.  Artificial disc between C6-C7     GENITOURINARY SURGERY      Abcess removed from bladder     HERNIA REPAIR       VASCULAR SURGERY      Vericose vein stripping       -    -           MENTAL STATUS EXAM:      Constitutional: General appearance of patient:  Appearance:  awake, alert, appeared as age stated, adequate groomed and slightly unkempt  Attitude:  cooperative  Eye Contact:  good  Mood:   Euthymic  Affect:  congruent   Speech:  clear, coherent normal rate   Psychomotor Behavior:  no evidence of tardive dyskinesia, dystonia, or tics  Thought Process:  logical, linear and goal oriented  Associations:  no loose associations  Thought Content:  no evidence of psychotic thought and active suicidal ideation present  Denied any active suicidal /homicidation ideation plan intent   Insight:  fair  Judgment:  fair  Oriented to:  time, person, and place  Attention Span and Concentration:  intact  Recent and Remote Memory:  intact  Language:  english with appropriate syntax and vocabulary  Fund of Knowledge: appropriate  Muscle Strength and Tone: normal  Gait and Station: Normal     There are no abnormal or psychotic thoughts, no preoccupations, no overvalued ideas, no rumination, no obsessions, no compulsions, no somatic concerns, no hypochrondriasis, no ideas of reference, and no delusions.  Patient denies homicidal thoughts.   Patient denies suicidal thoughts.  Patient appears to have good judgment and good insight.     Musculoskeletal: Patient shows no abnormalities of motor activity: there is no tremor, no tic, and no  dystonia.  There is no apparent muscle atrophy, strength and tone appear normal, and there are no abnormal movements.  Patient has normal gait and stance.    DISCUSSION:         Assessment:       Patient has a biological predisposition with family history positive for alcoholism  Psychologically patient is experiencing abusing alcohol  Patient has these particular stressors strained her relationships  Patient has chronic illness exacerbation leading to hospitalization progression as described.     Patient has been unable to stop using drugs in the community due to both physical and psychological symptoms.  Continued use will put the patient at risk for medical and/or psychiatric complications.      Inpatient psychiatric hospitalization is warranted at this time for safety, stabilization, and possible adjustment in medications.       Diagnoses:    Alcohol use disorder severe  Alcohol withdrawal severe  Benzodiazepine withdrawal          Plan:   Problem list  1#     - MSSA protocol using Valium for management of alcohol withdrawal    - Continue thiamine, folate, and multivitamin daily    MSSA    Eating Disturbances: ate and enjoyed all of it or not applicable  Tremor: 2  Sleep Disturbance: slept through the night or not applicable  Clouding of Sensorium: no evidence  Hallucinations: 0 - none  Quality of Contact: 0 - awareness of examiner and people around him/her  Agitation: 0 - normal activity  Paroxysmal Sweats: 1 - barely perceptible sweating  Temperature: 99.5 or below  Pulse: 2 - 80 to 89  Total MSSA Score: 6   Patient's pulse is 73 blood pressure 123/93  Patient received 2 mg of lorazepam  2#patient detox of temazepam using phenobarbital 32.4 mg    3#blood urea nitrogen is elevated 4.1 creatinine is elevated 1.94 GFR is low 37 we will put internal medicine consult  4#patient has elevated  from the alcohol not nonviral suspected  5#RBC is low 4.31 we will put internal medicine consult    conitnue zoloft  150 mg for mood  - Consider anti-craving medications prior to discharge. Pt willing to review additional information about both naltrexone and Antabuse.    Alcohol withdrawal nausea prn Zofran as needed for nausea     hydroxyzine 25 mg q4h prn for acute anxiety  Trazodone 50 mg at bedtime prn for sleep disturbances       Patient has been unable to stop using drugs in the community due to both physical and psychological symptoms.  Continued use will put the patient at risk for medical and/or psychiatric complications.    I HAVE REVIEWED LABS WITH PT AND TALKED ABOUT RESULTS WITH PT  I HAVE REVIEWED AND SUMMARIZED OLD RECORDS including his medication reconcilation of his home medications  and PDMP   I HAVE SPOKEN WITH RN ABOUT MEDICATIONS AND DETOX SCORES  I HAVE SPOKEN WITH CM ABOUT PTS TREATMENT OPTIONS               Laboratory/Imaging:    Liver Function Studies -   Recent Labs   Lab Test 02/20/22  1601   PROTTOTAL 7.0   ALBUMIN 3.3*   BILITOTAL 0.6   ALKPHOS 108   AST 35   ALT 43      Last Comprehensive Metabolic Panel:  Sodium   Date Value Ref Range Status   02/20/2022 140 133 - 144 mmol/L Final     Potassium   Date Value Ref Range Status   02/20/2022 4.0 3.4 - 5.3 mmol/L Final     Chloride   Date Value Ref Range Status   02/20/2022 107 94 - 109 mmol/L Final     Carbon Dioxide (CO2)   Date Value Ref Range Status   02/20/2022 28 20 - 32 mmol/L Final     Anion Gap   Date Value Ref Range Status   02/20/2022 5 3 - 14 mmol/L Final     Glucose   Date Value Ref Range Status   02/20/2022 123 (H) 70 - 99 mg/dL Final     Urea Nitrogen   Date Value Ref Range Status   02/20/2022 41 (H) 7 - 30 mg/dL Final     Creatinine   Date Value Ref Range Status   02/20/2022 1.94 (H) 0.66 - 1.25 mg/dL Final     GFR Estimate   Date Value Ref Range Status   02/20/2022 37 (L) >60 mL/min/1.73m2 Final     Comment:     Effective December 21, 2021 eGFRcr in adults is calculated using the 2021 CKD-EPI creatinine equation which includes age and  "gender (Haroldo torres al., NEJ, DOI: 10.1056/BHISce3328356)     Calcium   Date Value Ref Range Status   02/20/2022 9.4 8.5 - 10.1 mg/dL Final     Bilirubin Total   Date Value Ref Range Status   02/20/2022 0.6 0.2 - 1.3 mg/dL Final     Alkaline Phosphatase   Date Value Ref Range Status   02/20/2022 108 40 - 150 U/L Final     ALT   Date Value Ref Range Status   02/20/2022 43 0 - 70 U/L Final     AST   Date Value Ref Range Status   02/20/2022 35 0 - 45 U/L Final                   Medical treatment/interventions:  Medical concerns: As above    - Consults: IM consult placed. Appreciate assistance.     Legal Status: Voluntary     Safety Assessment:   Checks: Status 15  Pt has not required locked seclusion or restraints in the past 24 hours to maintain safety, please refer to RN documentation for further details.    The risks, benefits, alternatives and side effects have been discussed and are understood by the patient.       Patient will be treated in therapeutic milieu with appropriate individual and group therapies as described.  Disposition: Pending clinical stabilization. Pt does  appear interested in COMPLETE DETOX AND DO TRT  Length of stay 3-5 days        \"Much or all of the text in this note was generated through the use of Dragon Dictate voice to text software. Errors in spelling or words which appear to be out of contact are unintentional, may be present due having escaped editing\"     "

## 2022-02-21 NOTE — PROGRESS NOTES
Writer met with patient who reports that he is scheduled to admit to [facility] once he is out of detox, and that either his son or daughter will be able to pick him up. Patient is agreeable to signing KAY for [facility]. States he is not feeling well at the time being.

## 2022-02-21 NOTE — PROGRESS NOTES
Writer faxed over signed KAY to [facility] at patient's request.     Writer spoke with Avni over at [facility] who indicated that patient is aware that he cannot be on Temezepam or Phenobarbital taper when he admits there. Requested writer provide patient with a list of what to bring to treatment that he emailed to writer, and asked that CM/nursing keep [facility] updated on when patient's potential admit date might be. Avni clearly stated patient will need to come door to door from [facility].

## 2022-02-21 NOTE — PROGRESS NOTES
02/20/22 2218   Patient Belongings   Patient Belongings sent to security per site process;other (see comments)  (meds to security and in the med room)   Patient Belongings Remaining with Patient other (see comments)   Patient Belongings Put in Hospital Secure Location (Security or Locker, etc.) other (see comments)   Belongings Search Yes   Clothing Search Yes   Second Staff Bubba   Storage bin: coat, toiletries kit, thermos, suitcase    Med room bin: cell phone, wallet, charging cord, env #[id number] #[id number]: meds from home    Security: env # [id number]: med from home, env # [id number]: $87 cash, 2 visa, mastercard  A               Admission:  I am responsible for any personal items that are not sent to the safe or pharmacy.  [facility] is not responsible for loss, theft or damage of any property in my possession.    Signature:  _________________________________ Date: _______  Time: _____                                              Staff Signature:  ____________________________ Date: ________  Time: _____      2nd Staff person, if patient is unable/unwilling to sign:    Signature: ________________________________ Date: ________  Time: _____     Discharge:  [facility] has returned all of my personal belongings:    Signature: _________________________________ Date: ________  Time: _____                                          Staff Signature:  ____________________________ Date: ________  Time: _____

## 2022-02-21 NOTE — PHARMACY-ADMISSION MEDICATION HISTORY
Admission Medication History Completed by Pharmacy    See [facility] Admission Navigator for allergy information, preferred outpatient pharmacy, prior to admission medications and immunization status.     Medication History Sources:     Patient    Sure Scripts    Care Everywhere    Changes made to PTA medication list (reason):    Added:   o Proctofoam (per patient and Sure Scripts)    Deleted: None    Changed: None    Additional Information:    Patient was a good historian of his medications and was able to confirm most names, doses, and directions. Patient states that he wasn't sure how much tamsulosin he was taking per day but fill history aligns with prescribed dose.    Prior to Admission medications    Medication Sig Last Dose Taking? Auth Provider   aspirin (ASA) 81 MG EC tablet Take 81 mg by mouth daily 2/19/2022 Yes Reported, Patient   finasteride (PROSCAR) 5 MG tablet Take 5 mg by mouth daily 2/18/2022 Yes Reported, Patient   furosemide (LASIX) 20 MG tablet Take 40 mg by mouth daily 2/18/2022 Yes Reported, Patient   hydrocortisone-pramoxine (PROCTOFOAM-HC) rectal foam Place 1 Applicatorful rectally 3 times daily as needed PRN Yes Unknown, Entered By History   metoprolol tartrate (LOPRESSOR) 25 MG tablet Take 25 mg by mouth 2 times daily 2/19/2022 Yes Reported, Patient   rosuvastatin (CRESTOR) 40 MG tablet Take 40 mg by mouth daily 2/18/2022 Yes Reported, Patient   sertraline (ZOLOFT) 100 MG tablet Take 150 mg by mouth daily 2/18/2022 Yes Reported, Patient   tamsulosin (FLOMAX) 0.4 MG capsule Take 1.2 mg by mouth At Bedtime 2/18/2022 Yes Reported, Patient   temazepam (RESTORIL) 15 MG capsule Take 15 mg by mouth nightly as needed for sleep PRN Yes Reported, Patient   vitamin D3 (CHOLECALCIFEROL) 50 mcg (2000 units) tablet Take 1 tablet by mouth daily 2/18/2022 Yes Reported, Patient       Date completed: 02/20/22    Medication history completed by: Cheryl Dawn

## 2022-02-21 NOTE — PROGRESS NOTES
PDMP as of 2/21/2022:     Erwin Devlin, 71M  Refine Search  Contact the [facility]  YOB: 1950  Recent Address:  [address] [place] [address]  View Linked Records (2)  Other Tools/Metrics  [facility]  Report generated on 02/21/2022. Report Date Range: 02/21/2021 - 02/21/2022  [place]  Narx Scores  Narcotic  110  Sedative  261  Stimulant  000  Explanation and Guidance  Overdose Risk Score  010  (Range 000-999)  Explanation and Guidance  State Indicators (0)  Details  RX Graph   Narcotic   Buprenorphine   Sedative   Stimulant   Other  Learn how to use graph  Prescribers  1 - Christiane Chairez MD  Timeline  02/21  2m  6m  1y  2y  Disclaimer  Morphine Milligram Equivalent Prescribed Over Time  Last 30 Days  Last 60 Days  Last 90 Days  Last 1 Year  Last 2 Years  MME  Timeframe  0  1  2  1/23/22 2/7/22 2/21/22  0  MME per Day Avg.  0  MME per RX  Disclaimer  Lorazepam MgEq (LME) Prescribed Over Time  Last 30 Days  Last 60 Days  Last 90 Days  Last 1 Year  Last 2 Years  LME  Timeframe  0  1  2  1/23/22 2/7/22 2/21/22  0.8  LME Per Day Avg.  11.3  LME mg Per Rx  Disclaimer  Buprenorphine (mg) Prescribed Over Time  Last 30 Days  Last 60 Days  Last 90 Days  Last 1 Year  Last 2 Years  mg  Timeframe  0  1  2  1/23/22 2/7/22 2/21/22  0  mg Per Day Avg.  0  Avg mg Per Rx  Disclaimer  RX Summary  Summary  Total Prescriptions 12  Total Private Pay 0  Total Prescribers 1  Total Pharmacies 1  Opioids* (excluding Buprenorphine)  Current Qty 0  Current MME/day 0.00  30 Day Avg MME/day 0.00  Buprenorphine*  Current Qty 0  Current mg/day 0.00  30 Day Avg mg/day 0.00  RX Summary Expanded  Narcotics (excluding Buprenorphine)  30 Day Avg. MME 0.00  90 Day Avg. MME 0.00  Rx Count/12 Months 0  Prescriber #/6 Months 0  Pharmacy #/6 Months 0  Current Quantity 0  Buprenorphine  30 Day Avg. mg/day 0.00  90 Day Avg. mg/day 0.00  Rx Count/12 Months 0  Prescriber #/6 Months 0  Pharmacy #/6  Months 0  Current Quantity 0  Sedatives  30 Day Avg. LME 0.75  90 Day Avg. LME 0.65  Rx Count/12 Months 12  Prescriber #/6 Months 1  Pharmacy #/6 Months 1  Current Quantity 12  Stimulants  30 Day Avg. mg/day 0.00  90 Day Avg. mg/day 0.00  Rx Count/12 Months 0  Prescriber #/6 Months 0  Pharmacy #/6 Months 0  Current Quantity 0  Prescriptions  Total: 12  Private Pay: 0  Showing 1-12 of 12 Items View 15 Items  1 of 1   Filled  Written  Sold  ID  Drug  QTY  Days  Prescriber  RX #  Dispenser  Refill  Daily Dose*  Pymt Type     02/04/2022 01/28/2022 02/07/2022 1   Temazepam 15 Mg Capsule  30.00 30 Ca Don [id number] Tho (3314) 0/0 0.75 LME [place]  01/05/2022 01/05/2022 01/06/2022 1   Temazepam 15 Mg Capsule  30.00 30 Ca Don [id number] Tho (3314) 0/0 0.75 LME [place]  12/03/2021 10/22/2021 12/07/2021 1   Temazepam 15 Mg Capsule  30.00 30 Ca Don [id number] Tho (3314) 1/1 0.75 LME [place]  10/25/2021 10/22/2021 10/25/2021 2   Temazepam 15 Mg Capsule  30.00 30 Ca Don [id number] Tho (3314) 0/1 0.75 LME [facility]  10/05/2021 10/05/2021 10/05/2021 2   Temazepam 15 Mg Capsule  30.00 30 Ca Don [id number] Tho (3314) 0/0 0.75 LME [place]  09/10/2021 09/10/2021 09/10/2021 2   Temazepam 15 Mg Capsule  30.00 30 Ca Don [id number] Tho (3314) 0/0 0.75 LME [place]  08/09/2021 08/09/2021 08/12/2021 2   Temazepam 15 Mg Capsule  30.00 30 Ca Don [id number] Tho (3314) 0/0 0.75 LME [place]  07/05/2021 07/05/2021 07/07/2021 2   Temazepam 15 Mg Capsule  30.00 30 Ca Don [id number] Tho (3314) 0/0 0.75 LME [place]  06/08/2021 06/08/2021 06/08/2021 2   Temazepam 15 Mg Capsule  30.00 30 Ca Don [id number] Tho (3314) 0/0 0.75 LME [place]  05/04/2021 05/04/2021 05/07/2021 2   Temazepam 15 Mg Capsule  30.00 30 Ca Don [id number] Tho (3314) 0/0 0.75 L[place]  04/07/2021 04/02/2021 04/08/2021 2   Temazepam 15 Mg Capsule  30.00 30 Ca Don [id number] Tho (3314) 0/0 0.75 L[place]  03/08/2021 03/04/2021 03/10/2021 2   Temazepam 15 Mg Capsule  30.00 30 Ca  [place] [id number] [facility] (9291) 0/0 0.75 LME [place]  Disclaimer  Showing 1-12 of 12 Items View 15 Items  1 of 1   Providers  Total: 1  Showing 1 Item View 15 Items  1 of 1   Name  Address  City  State  Zipcode  Phone   Christiane Chairez  [address] [facility] [address] [phone]  Showing 1 Item View 15 Items  1 of 1   Pharmacies  Total: 1  Showing 1 Item View 15 Items  1 of 1   Name  Address  City  State  Zipcode  Phone   [facility] [phone] [facility] [phone]  Showing 1 Item View 15 Items  1 of 1   The report provided is based upon the search criteria entered and the corresponding data as it has been reported by dispenser(s). If erroneous information is identified or additional information is needed, please contact the dispenser or the prescriber provided on the report. Date Sold signifies the date the prescription was sold (left the pharmacy). The absence of Date Sold does not necessarily indicate the prescription was not dispensed. Fill Date represents the date the medication was filled or prepared by the pharmacy. Note, federal regulation (CFR Title 42: Part 2) requires patient consent prior to releasing certain patient data from federally funded opioid treatment programs (OTPs). As such, controlled substances dispensed from OTPs for medication-assisted treatment may not appear in the [place]  report. Morphine milligram equivalent (MME) conversion factors published by the CDC are used in the MME calculation. Per the CDC, the MME conversion factor is intended only for analytic purposes where prescription data are used to retrospectively calculate daily MME to inform analyses of risks associated with opioid prescribing. This value does not constitute clinical guidance or recommendations for converting patients from one form of opioid analgesic to another. Per the CDC, the conversion factors for drugs prescribed or provided, as part of medication-assisted treatment for opioid  use disorder should not be used to benchmark against MME dosage thresholds meant for opioids prescribed for pain. Buprenorphine products listed in the CDC s MME file do not have an associated conversion factor. Lastly, the CDC notes, in clinical practice, calculating MME for methadone often involves a sliding-scale approach, whereby the conversion factor increases with increasing dose. The conversion factor of 3 for methadone presented in this file could underestimate MME for a given patient. This report contains confidential information, including patient identifiers, and is not a public record. The information on this report must be treated as protected health information and is only to be disclosed to others as authorized by applicable state and Federal regulations.

## 2022-02-21 NOTE — ED NOTES
"ED to Behavioral Floor Handoff    SITUATION  Erwin Devlin is a 71 year old male who speaks English and lives in a home unknown The patient arrived in the ED by private car from home with a complaint of Alcohol Intoxication (Last drink last night.  10 wiskey per day.  No seizures.  No drugs.  Pt states they got him hooked on Tamazepam 6 years ago)  .The patient's current symptoms started/worsened 1 day(s) ago and during this time the symptoms have remained the same.   In the ED, pt was diagnosed with   Final diagnoses:   Alcohol dependence with unspecified alcohol-induced disorder (H)   Benzodiazepine abuse (H)        Initial vitals were: BP: 133/83  Pulse: 102  Temp: 98.1  F (36.7  C)  Resp: 18  Height: 177.8 cm (5' 10\")  Weight: 104.2 kg (229 lb 12.8 oz)  SpO2: 94 %   --------  Is the patient diabetic? No   If yes, last blood glucose? --     If yes, was this treated in the ED? --  --------  Is the patient inebriated (ETOH) Yes or Impaired on other substances? No  MSSA done? Yes  Last MSSA score: --    Were withdrawal symptoms treated? No  Does the patient have a seizure history? No. If yes, date of most recent seizure--  --------  Is the patient patient experiencing suicidal ideation? denies current or recent suicidal ideation     Homicidal ideation? denies current or recent homicidal ideation or behaviors.    Self-injurious behavior/urges? denies current or recent self injurious behavior or ideation.  ------  Was pt aggressive in the ED No  Was a code called No  Is the pt now cooperative? Yes  -------  Meds given in ED: Medications - No data to display   Family present during ED course? Yes  Family currently present? Yes    BACKGROUND  Does the patient have a cognitive impairment or developmental disability? No  Allergies:   Allergies   Allergen Reactions     Other Environmental Allergy Rash     Paper tape gives him blisters      Penicillins      Other reaction(s): Unknown Reaction  Doesn't know the " reaction because he was an infant     .   Social demographics are   Social History     Socioeconomic History     Marital status:      Spouse name: None     Number of children: None     Years of education: None     Highest education level: None   Occupational History     None   Tobacco Use     Smoking status: Former Smoker     Smokeless tobacco: Former User   Substance and Sexual Activity     Alcohol use: Yes     Drug use: Never     Sexual activity: None   Other Topics Concern     None   Social History Narrative     None     Social Determinants of Health     Financial Resource Strain: Not on file   Food Insecurity: Not on file   Transportation Needs: Not on file   Physical Activity: Not on file   Stress: Not on file   Social Connections: Not on file   Intimate Partner Violence: Not on file   Housing Stability: Not on file        ASSESSMENT  Labs results   Labs Ordered and Resulted from Time of ED Arrival to Time of ED Departure   COMPREHENSIVE METABOLIC PANEL - Abnormal       Result Value    Sodium 140      Potassium 4.0      Chloride 107      Carbon Dioxide (CO2) 28      Anion Gap 5      Urea Nitrogen 41 (*)     Creatinine 1.94 (*)     Calcium 9.4      Glucose 123 (*)     Alkaline Phosphatase 108      AST 35      ALT 43      Protein Total 7.0      Albumin 3.3 (*)     Bilirubin Total 0.6      GFR Estimate 37 (*)    ROUTINE UA WITH MICROSCOPIC REFLEX TO CULTURE - Abnormal    Color Urine Light Yellow      Appearance Urine Clear      Glucose Urine Negative      Bilirubin Urine Negative      Ketones Urine Negative      Specific Gravity Urine 1.016      Blood Urine Trace (*)     pH Urine 5.5      Protein Albumin Urine 30  (*)     Urobilinogen Urine Normal      Nitrite Urine Negative      Leukocyte Esterase Urine Negative      Mucus Urine Present (*)     RBC Urine <1      WBC Urine <1      Squamous Epithelials Urine <1      Hyaline Casts Urine 1     CBC WITH PLATELETS AND DIFFERENTIAL - Abnormal    WBC Count 6.4    "   RBC Count 4.31 (*)     Hemoglobin 13.7      Hematocrit 40.7      MCV 94      MCH 31.8      MCHC 33.7      RDW 11.6      Platelet Count 233      % Neutrophils 66      % Lymphocytes 19      % Monocytes 11      % Eosinophils 2      % Basophils 1      % Immature Granulocytes 1      NRBCs per 100 WBC 0      Absolute Neutrophils 4.3      Absolute Lymphocytes 1.2      Absolute Monocytes 0.7      Absolute Eosinophils 0.1      Absolute Basophils 0.1      Absolute Immature Granulocytes 0.1      Absolute NRBCs 0.0     DRUG ABUSE SCREEN 1 URINE (ED) - Abnormal    Amphetamines Urine Screen Negative      Barbiturates Urine Screen Negative      Benzodiazepines Urine Screen Positive (*)     Cannabinoids Urine Screen Negative      Cocaine Urine Screen Negative      Opiates Urine Screen Negative     COVID-19 VIRUS (CORONAVIRUS) BY PCR - Normal    SARS CoV2 PCR Negative     ETHYL ALCOHOL LEVEL - Normal    Alcohol ethyl <0.01        Imaging Studies: No results found for this or any previous visit (from the past 24 hour(s)).   Most recent vital signs BP (!) 165/99   Pulse 81   Temp 98.5  F (36.9  C) (Oral)   Resp 16   Ht 1.778 m (5' 10\")   Wt 104.2 kg (229 lb 12.8 oz)   SpO2 96%   BMI 32.97 kg/m     Abnormal labs/tests/findings requiring intervention:---   Pain control: pt had none  Nausea control: pt had none    RECOMMENDATION  Are any infection precautions needed (MRSA, VRE, etc.)? No If yes, what infection? --  ---  Does the patient have mobility issues? independently. If yes, what device does the pt use? ---  ---  Is patient on 72 hour hold or commitment? No If on 72 hour hold, have hold and rights been given to patient? No  Are admitting orders written if after 10 p.m. ?No  Tasks needing to be completed:---     Kathy Tristan, RN   ascom--    [phone]   [phone]    "

## 2022-02-21 NOTE — CONSULTS
Consult Date: 02/21/2022    HISTORY OF PRESENT ILLNESS:  The patient is a pleasant 71-year-old gentleman admitted to station 3A for alcohol abuse and detoxification.  Drinking on average 10-12 ounces of whiskey per day.  Blood alcohol level on admission, however, negative.  An Internal Medicine consultation was ordered by Dr. Currie to assess medical problems including chronic kidney disease and history of coronary artery disease.  At this time, Erwin feels much better since admission.  Denies fever, chills, chest pain, shortness of breath, abdominal pain, nausea, bowel or bladder concerns.    PAST MEDICAL HISTORY:  1.  Alcohol use disorder and other psychiatric history per Dr. Currie.  2.  History of coronary artery disease, status post stenting x2.  The patient did have a recent cardiac catheterization due to dyspnea on exertion that showed patent stents and otherwise mild nonobstructive coronary disease.  Recent echocardiogram normal.  The patient attributes dyspnea on exertion to deconditioned state from alcohol use.  3.  Hypertension.  4.  Hyperlipidemia.  5.  Chronic kidney disease, with baseline creatinine around 2.  6.  Benign prostatic hypertrophy.  7.  Status post cervical fusion.  8.  Status post abscess resection from bladder.  9.  Status post varicose vein stripping.  10.  Status post herniorrhaphy.  11.  Denies history of other chronic medical problems and surgeries.    ADMISSION MEDICATIONS:  Reviewed and listed in medication reconciliation list.    ALLERGIES AND ADVERSE EFFECTS:  PENICILLIN AND PAPER TAPE.    SOCIAL HISTORY:  .  Has grown children.  Lives [place].  Retired.  Used to work as a .  Denies smoking cigarettes.    FAMILY HISTORY:  Reviewed.    REVIEW OF SYSTEMS:  A 10-point review of systems negative, except as stated above in history of present illness.    PHYSICAL EXAMINATION:    GENERAL:  Pleasant gentleman in no acute distress.  VITAL SIGNS:  Stable, temperature  afebrile, pulse in the 70s, blood pressure 150s/90s.  LUNGS:  Clear.  CARDIOVASCULAR:  Regular rate and rhythm.  ABDOMEN:  Soft, nontender.  EXTREMITIES:  No edema.  SKIN:  No rash on exposed areas.  NEUROLOGIC:  He is awake, alert and oriented x3.  Cranial nerves grossly intact.  Motor strength is symmetric.  Gait deferred.    LABORATORY DATA:  Urine drug screen positive for benzodiazepines.  Creatinine 1.94.  Otherwise, CBC and rest of comprehensive medical panel unremarkable.  Urinalysis unremarkable.  Alcohol level negative.  COVID testing negative.    IMPRESSION:  1.  Alcohol abuse and withdrawal per Dr. Currie.  2.  Chronic kidney disease, of which admission creatinine appears to be at baseline.  3.  Hypertension, stable, with current pressure is mildly elevated, most likely secondary to alcohol withdrawal and anxiety.  4.  History of coronary artery disease, status post stenting x2 approximately 6 years ago with recent cardiac workup including left heart catheterization unremarkable.  5.  History of dyspnea on exertion, likely due to deconditioned state as workup for underlying cardiac etiology as above negative.    PLAN:  His blood pressures will be monitored closely.  Continue with his multiple medications he takes for his multiple chronic medical problems, all of which are currently stable.  He should follow up with the family physician after discharge within 1-2 weeks for hospital followup and repeat labs including renal function, also for possible referral to Nephrology.  No further medical recommendations at this time.  The patient is medically stable and Medicine signing off.  Please feel free to call if questions.    Thank you for this consultation.    Hai Owen PA-C        D: 2022   T: 2022   MT: [id number]    Name:     CHAR WOODS  MRN:      [id number]        Account:      [id number]   :      1950           Consult Date: 2022     Document:  [id number]

## 2022-02-21 NOTE — PLAN OF CARE
Behavioral Team Discussion: (2/21/2022)    Continued Stay Criteria/Rationale: Patient admitted for alcohol withdrawal, complicated.  Plan: The following services will be provided to the patient; psychiatric assessment, medication management, therapeutic milieu, individual and group support, and skills groups.   Participants: 3A Provider: Dr. Zita Currie MD; 3A RN: Regine Moore RN; 3A CM's: Liz Payne  and Marleni Sanabria.  Summary/Recommendation: Providers will assess today for treatment recommendations, discharge planning, and aftercare plans. CM will meet with pt for discharge planning.   Medical/Physical: Internal medicine consult to be completed 2/21/2022.  Precautions:   Behavioral Orders   Procedures     Code 1 - Restrict to Unit     Fall precautions     Routine Programming     As clinically indicated     Status 15     Every 15 minutes.     Withdrawal precautions     Rationale for change in precautions or plan: N/A  Progress: No Change.    PT SEEN   AGREE WITH ASSESSMENT AND PLAN

## 2022-02-21 NOTE — PLAN OF CARE
"         SBAR        S = Situation:   Voluntary Admit      B  = Background:   71 year old male admitted to [facility] for alcohol detox with past medical history of CKD, and 2 heart stents placed two years ago. Pt states he has been drinking 8-12 shots of whiskey per day for the last 5 years. Last use yesterday night. Breathalyzer upon arrival to the ED was 0. Pt is also prescribed 15 mg of Temazepam for the past 6 years. Pt denies hx of withdrawal seizures or DTs.      A  =  Assessment:  Pt is alert and oriented x 3. Pt was tearful upon admission stating \"I really messed up\". Pt MSSA score of 8 given 1 mg of Ativan and scheduled Phenobarbital. Pt was also given his scheduled home night medications. Pt denies SI/HI/SIB.       R =   Request or Recommendation:   Continue to monitor the patient's withdrawal and to medicate him as needed.      Internal Medicine, psychiatry and CM to assess.                "

## 2022-02-22 PROCEDURE — 250N000013 HC RX MED GY IP 250 OP 250 PS 637: Performed by: PHYSICIAN ASSISTANT

## 2022-02-22 PROCEDURE — 250N000013 HC RX MED GY IP 250 OP 250 PS 637: Performed by: PSYCHIATRY & NEUROLOGY

## 2022-02-22 PROCEDURE — 128N000004 HC R&B CD ADULT

## 2022-02-22 PROCEDURE — 99231 SBSQ HOSP IP/OBS SF/LOW 25: CPT | Performed by: PSYCHIATRY & NEUROLOGY

## 2022-02-22 PROCEDURE — H2032 ACTIVITY THERAPY, PER 15 MIN: HCPCS

## 2022-02-22 RX ORDER — TRAZODONE HYDROCHLORIDE 50 MG/1
50-100 TABLET, FILM COATED ORAL
Status: DISCONTINUED | OUTPATIENT
Start: 2022-02-22 | End: 2022-02-23 | Stop reason: HOSPADM

## 2022-02-22 RX ADMIN — SERTRALINE HYDROCHLORIDE 150 MG: 50 TABLET ORAL at 08:46

## 2022-02-22 RX ADMIN — ASPIRIN 81 MG: 81 TABLET, COATED ORAL at 08:46

## 2022-02-22 RX ADMIN — TAMSULOSIN HYDROCHLORIDE 1.2 MG: 0.4 CAPSULE ORAL at 20:49

## 2022-02-22 RX ADMIN — MULTIPLE VITAMINS W/ MINERALS TAB 1 TABLET: TAB at 08:46

## 2022-02-22 RX ADMIN — PHENOBARBITAL 32.4 MG: 32.4 TABLET ORAL at 20:49

## 2022-02-22 RX ADMIN — THIAMINE HCL TAB 100 MG 100 MG: 100 TAB at 08:50

## 2022-02-22 RX ADMIN — METOPROLOL TARTRATE 25 MG: 25 TABLET, FILM COATED ORAL at 08:46

## 2022-02-22 RX ADMIN — NICOTINE POLACRILEX 4 MG: 2 LOZENGE ORAL at 18:34

## 2022-02-22 RX ADMIN — FOLIC ACID 1 MG: 1 TABLET ORAL at 08:47

## 2022-02-22 RX ADMIN — ROSUVASTATIN CALCIUM 40 MG: 20 TABLET, FILM COATED ORAL at 08:46

## 2022-02-22 RX ADMIN — TRAZODONE HYDROCHLORIDE 100 MG: 50 TABLET ORAL at 20:49

## 2022-02-22 RX ADMIN — FUROSEMIDE 40 MG: 40 TABLET ORAL at 08:46

## 2022-02-22 RX ADMIN — Medication 50 MCG: at 08:46

## 2022-02-22 RX ADMIN — METOPROLOL TARTRATE 25 MG: 25 TABLET, FILM COATED ORAL at 20:49

## 2022-02-22 RX ADMIN — NICOTINE POLACRILEX 4 MG: 2 LOZENGE ORAL at 08:49

## 2022-02-22 RX ADMIN — FINASTERIDE 5 MG: 5 TABLET, FILM COATED ORAL at 08:47

## 2022-02-22 ASSESSMENT — ACTIVITIES OF DAILY LIVING (ADL)
HYGIENE/GROOMING: INDEPENDENT
DRESS: INDEPENDENT
LAUNDRY: WITH SUPERVISION
ORAL_HYGIENE: INDEPENDENT

## 2022-02-22 NOTE — DISCHARGE INSTRUCTIONS
Behavioral Discharge Planning and Instructions  THANK YOU FOR CHOOSING THE [facility]  3A  [phone]    Summary: You were admitted to [facility] on 2/20/22 for detoxification from alcohol.  A medical exam was performed that included lab work. You have met with a  and opted to enter [facility].  Please take care and make your recovery a priority, Erwin!      Main Diagnosis: Per Dr. Zita Currie MD  Alcohol use disorder severe  Alcohol withdrawal severe  Benzodiazepine withdrawal    Major Treatments, Procedures and Findings:  You were detoxed from alcohol with the Modified Selective Severity Protocol using Ativan. You have met with a  to develop a treatment plan for discharge.  You have had labs drawn and a copy of those labs will be sent home with you.  Please bring your lab results with to your follow up doctor appointment.    Symptoms to Report:  If you experience more anxiety, confusion, sleeplessness, deep sadness or thoughts of suicide, notify your treatment team or notify your primary care physician. IF ANY OF THE SYMPTOMS YOU ARE EXPERIENCING ARE A MEDICAL EMERGENCY CALL 911 IMMEDIATELY.     Lifestyle Adjustment: Adjust your lifestyle to get enough sleep, relaxation, exercise and  good nutrition. Continue to develop healthy coping skills to decrease stress and promote a sober living environment. Do not use alcohol, illegal drugs or addictive medications other than what is currently prescribed. AA, NA, and  Sponsor are excellent resources for support.     Primary Provider:    Disposition: [facility]      Facts about COVID19 at www.cdc.gov/COVID19 and www.[place].gov/covid19    Keeping hands clean is one of the most important steps we can take to avoid getting sick and spreading germs to others.  Please wash your hands frequently and lather with soap for at least 20 seconds!    Follow-up Appointment:   Appointment Date/Time: March 28 th 2022 on  "Monday  Psychiatrist/Primary Care Giver:Chaka Hutchins                                                                                                                                Address:[place] [address]                                                                                                        Phone Number:[phone]    Resources:   Resources for on line recovery meetings:    *due to covid-19 AA/NA meetings are being held online*      AA meetings can be found online; search for them at: http://aa-intergroup.org/directory.php  AA meetings via [place] for [place] area can be found online at: https://[place].org/find-a-meeting/holiday-closings/  NA meetings via [place] for [place] area can be found online at: https://sites.[employer].com/view/mnregionofnarcoticsanonymous/home?authuser=2    Www.[facility]  has online resources for meeting and recovery care including Podcast \"Let's Talk:Addiction & Recovery Podcasts    Www.[place]recovery.org     DISCHARGE RESOURCES:  -SMART Recovery - self management for addiction recovery:  www.smartrecovery.org    -Pathways ~ A Health Crisis Resource & Support Center: 328.789.1642.  -[facility] [phone]   -[facility] 371-737-4065 or 840-623-2534.  -Suicide Awareness Voices of Education (SAVE) (www.save.org): 014-812-FNXC (8204)  -National Suicide Prevention Line (www.mentalhealthmn.org): 584-849-PZKZ (2539)  -National [place] on Mental Illness (www.[place].analias.org): 509.249.4111 or 960-848-0397.  -Zcbf1gadr: text the word LIFE to 18809 for immediate support and crisis intervention  -Mental Health Consumer/Survivor Network of [place] " (www.[facility].net): 715-756-3744 or 068-035-0105  -Mental Health Association of [place] (www.mentalhealth.org): 449.653.7541 or 336-213-2194     -Substance Abuse and Mental Health Services (www.samhsa.gov)  -Harm Reduction Coalition (www. Harmreduction.org)  -www.prescribetoprevent.org or http://prescribetoprevent.org/video  -Poison control 1-759-445-4536   **[place] Opioid Prevention Coalition: www.opioidcoalition.org    Sober Support Group Information:  AA/NA & Sponsor/Support  -Alcoholics Anonymous (www.alcoholics-anonymous.org): for local information 24 hours/day  -AA Intergroup service office in [place] (http://www.aastpaul.org/) 130.725.7687  -AA Intergroup service office in [facility]: 951.468.4964. (http://www.a[place].org/)  -Narcotics Anonymous (www.nam[place].org) (377) 718-3744   **Sober Fun Activities: www.sober-activities.[employer]/[facility]//[facility] [place] Recovery Connection ([facility])  [facility] connects people seeking recovery to resources that help foster and sustain long-term recovery.  Whether you are seeking resources for treatment, transportation, housing, job training, education, health care or other pathways to recovery, [facility] is a great place to start.    Phone: (912) 597-3148.  www.[place].[employer]Sw[employer] (Great listing of all types of recovery and non-recovery related resources)      General Medication Instructions:   See your medication sheet(s) for instructions.   Take all medicines as directed.  Make no changes unless your doctor suggests them.   Go to all your doctor visits.  Be sure to have all your required lab tests. This way, your medicines can be refilled on time.  Do not use any drugs not prescribed by your provider.  AA/NA and Sponsors are excellent resources for support  Avoid alcohol.    Any follow up concerns:  Nursing questions call the [facility] [phone]  Medical Record call [phone]  Outpatient Behavioral Intake call [phone]  LP+ Wait List/Bed  Availability call [phone]    The entire treatment team has appreciated the opportunity to work with you Erwin.  We wish you the best in the future and with your lifelong recovery goals. Please bring this discharge folder with you to all follow up appointments.  It contains your lab results, diagnosis, medication list and discharge recommendations.    THANK YOU FOR CHOOSING THE [facility]

## 2022-02-22 NOTE — PROGRESS NOTES
Received call from Pt's wife.  Pt to be picked up by a relative tomorrow at 4 PM.  Provided number to unit and relative will call when in the [place].

## 2022-02-22 NOTE — PLAN OF CARE
Problem: Substance Withdrawal  Goal: Substance Withdrawal  Description: Signs and symptoms of listed problems will be absent or manageable.  Outcome: Ongoing, Progressing     Patient is tolerating intakes.   Denies SI, HI SIB , depression or Anxiety.  Patient 's BP was 173/103 this am, rechecked is 148/82.  Will continue to monitor.

## 2022-02-22 NOTE — PROGRESS NOTES
[facility]   Psychiatric Progress Note        Interim history   This is a 71 year old male with Alcohol use disorder severe  Alcohol withdrawal severe  Benzodiazepine withdrawal.Pt seen in rounds.   The patient's care was discussed with the treatment team during the daily team meeting and/or staff's chart notes were reviewed.  Staff report patient has been visible in the milieu,  no acute eventsovernight.     Patient's mood is better  Energy Level:better   Sleep:poor  Appetite:fair motivation interest improving   Denied any Suicidal/homicidal ideation/plan intent.  Denied any psychosis  No prior suicde attempts  No access to gun  Pt is in alcohol withdrawal still being monitered every 4 hrs for it,   Pt mssa score are monitered  Tolerating meds and has no side effects.              Medications:     Current Facility-Administered Medications   Medication     acetaminophen (TYLENOL) tablet 650 mg     alum & mag hydroxide-simethicone (MAALOX) suspension 30 mL     aspirin EC tablet 81 mg     atenolol (TENORMIN) tablet 50 mg     finasteride (PROSCAR) tablet 5 mg     folic acid (FOLVITE) tablet 1 mg     furosemide (LASIX) tablet 40 mg     hydrocortisone-pramoxine (PROCTOFOAM-HC) rectal foam 1 Applicatorful     hydrOXYzine (ATARAX) tablet 25 mg     loperamide (IMODIUM) capsule 2 mg     LORazepam (ATIVAN) tablet 1-4 mg     melatonin tablet 10 mg     metoprolol tartrate (LOPRESSOR) tablet 25 mg     multivitamin w/minerals (THERA-VIT-M) tablet 1 tablet     nicotine (COMMIT) lozenge 2-4 mg     ondansetron (ZOFRAN-ODT) ODT tab 4 mg     PHENobarbital (LUMINAL) tablet 32.4 mg     rosuvastatin (CRESTOR) tablet 40 mg     senna-docusate (SENOKOT-S/PERICOLACE) 8.6-50 MG per tablet 1 tablet     sertraline (ZOLOFT) tablet 150 mg     tamsulosin (FLOMAX) capsule 1.2 mg     thiamine (B-1) tablet 100 mg     Vitamin D3 (CHOLECALCIFEROL) tablet 50 mcg             Allergies:     Allergies   Allergen  "Reactions     Other Environmental Allergy Rash     Paper tape gives him blisters      Penicillins      Other reaction(s): Unknown Reaction  Doesn't know the reaction because he was an infant              Psychiatric Examination:   Blood pressure (!) 148/82, pulse 72, temperature 96.9  F (36.1  C), temperature source Temporal, resp. rate 16, height 1.778 m (5' 10\"), weight 103.9 kg (229 lb), SpO2 96 %.  Weight is 229 lbs 0 oz  Body mass index is 32.86 kg/m .    Appearance:  awake, alert and adequately groomed  Attitude:  cooperative  Eye Contact:  good  Mood:  better  Affect:  appropriate and in normal range and mood congruent  Speech:  clear, coherent rate /rhythm are good  Psychomotor Behavior:  no evidence of tardive dyskinesia, dystonia, or tics and intact station, gait and muscle tone  Throught Process:  logical  Associations:  no loose associations  Thought Content:  no evidence of suicidal ideation or homicidal ideation, no evidence of psychotic thought, no auditory hallucinations present and no visual hallucinations present  Insight:  fair  Judgement:  intact  Oriented to:  time, person, and place  Attention Span and Concentration:  intact  Recent and Remote Memory:  intact  Language fund of knowledge are adequate         Labs:   No results found for this or any previous visit (from the past 24 hour(s)).      DX   Alcohol use disorder severe  Alcohol withdrawal severe  Benzodiazepine withdrawal   PLAN  Pt is out of alcohol detox  Will continue phenobarbital to taper off temazepam    Will increase trazadone 50-100mg po at bedtime   MSSA    Eating Disturbances: ate and enjoyed all of it or not applicable  Tremor: 1 - not visibly apparent but can be felt by the examiner placing his fingertip slightly against the patient's fingertips  Sleep Disturbance: slept through the night or not applicable  Clouding of Sensorium: no evidence  Hallucinations: 0 - none  Quality of Contact: 0 - awareness of examiner and people " around him/her  Agitation: 0 - normal activity  Paroxysmal Sweats: 1 - barely perceptible sweating  Temperature: 99.5 or below  Pulse: 0 - 69 or below  Total MSSA Score: 3  spokw with pharmacy about conversion to temazepam to phenobarbital 15mg of temazepam=30mg of phenobarbital  Laboratory/Imaging: reviewed with patient   Consults: internal medicine consult reviewed  Patient will be treated in therapeutic milieu with appropriate individual and group therapies as described.  PDMP CHECKED     Supportive psychotheraoy provided, anita talked about recovery enviroment, relapse prevention, triggers to use.  Discussed with patient many issues of addiction,triggers, relapse, and establishing a solid recovery program.  Asked pt to be med complinat   Medical diagnoses to be addressed this admission:    Plan:  LABORATORY DATA:  Urine drug screen positive for benzodiazepines.  Creatinine 1.94.  Otherwise, CBC and rest of comprehensive medical panel unremarkable.  Urinalysis unremarkable.  Alcohol level negative.  COVID testing negative.     IMPRESSION:  1.  Alcohol abuse and withdrawal per Dr. Currie.  2.  Chronic kidney disease, of which admission creatinine appears to be at baseline.  3.  Hypertension, stable, with current pressure is mildly elevated, most likely secondary to alcohol withdrawal and anxiety.  4.  History of coronary artery disease, status post stenting x2 approximately 6 years ago with recent cardiac workup including left heart catheterization unremarkable.  5.  History of dyspnea on exertion, likely due to deconditioned state as workup for underlying cardiac etiology as above negative.     PLAN:  His blood pressures will be monitored closely.  Continue with his multiple medications he takes for his multiple chronic medical problems, all of which are currently stable.  He should follow up with the family physician after discharge within 1-2 weeks for hospital followup and repeat labs including renal function,  also for possible referral to Nephrology.  No further medical recommendations at this time.  The patient is medically stable and Medicine signing off.  Please feel free to call if questions.     Thank you for this consultation.    Legal Status: voluntary    Safety Assessment:   Checks:  15 min  Precautions: withdrawal precautions  Pt has not required locked seclusion or restraints in the past 24 hours to maintain safety, please refer to RN documentation for further details.  Discussed with patient many issues of addiction,triggers, relapse, and establishing a solid recovery program.  Able to give informed consent:  YES   Discussed Risks/Benefits/Side Effects/Alternatives: YES    After discussion of the indications, risks, benefits, alternatives and consequences of no treatment, the patient elects to complete detox and do trt.

## 2022-02-22 NOTE — PROGRESS NOTES
MSSA score 3. Last valium was 02/21/22 at 0500. BP elevated at 149/93, P 68. Pt asymptomatic. Pt appeared to be sleeping comfortably most of the night. No other concerns at this time. Will continues to monitor.

## 2022-02-22 NOTE — PLAN OF CARE
02/21/22 1803   Group Therapy Session   Group Attendance attended group session   Group Topic Covered coping skills/lifestyle management;leisure exploration/use of leisure time   Patient Response/Contribution discussed personal experience with topic;offered helpful suggestions to peers

## 2022-02-22 NOTE — PLAN OF CARE
Nursing Assessment    Pt had busy shift. Pt maintains full range affect, mood is viraj. Isolative to room and withdrawn . Pt did attend group this shift. During check in, pt noted having questions about discharge medications. Pt turned in  paperwork this shift. Spoke with wife x2 re:discharge planning and pt update. Wife reports daughter will be facilitating discharge plans. Spoke with daughter re: discharge plans. Addressed daughters concerns and referred daughter to speak with treatment team in the morning to finalize plans. Pt denies all mental health symptoms including SI/SIB/HI/AVH. Pt contracts for safety on the unit. No other behavioral concerns at this time.      MSSA score 6 at  2000    Pt took scheduled medications without issue.    Pt denies any other concerns at this time. Will continue to monitor and support.

## 2022-02-23 VITALS
BODY MASS INDEX: 32.78 KG/M2 | OXYGEN SATURATION: 97 % | DIASTOLIC BLOOD PRESSURE: 95 MMHG | RESPIRATION RATE: 16 BRPM | HEART RATE: 78 BPM | HEIGHT: 70 IN | SYSTOLIC BLOOD PRESSURE: 161 MMHG | TEMPERATURE: 97.2 F | WEIGHT: 229 LBS

## 2022-02-23 LAB — SARS-COV-2 RNA RESP QL NAA+PROBE: NEGATIVE

## 2022-02-23 PROCEDURE — 250N000013 HC RX MED GY IP 250 OP 250 PS 637: Performed by: PSYCHIATRY & NEUROLOGY

## 2022-02-23 PROCEDURE — 250N000013 HC RX MED GY IP 250 OP 250 PS 637: Performed by: PHYSICIAN ASSISTANT

## 2022-02-23 PROCEDURE — 99239 HOSP IP/OBS DSCHRG MGMT >30: CPT | Performed by: PSYCHIATRY & NEUROLOGY

## 2022-02-23 PROCEDURE — U0003 INFECTIOUS AGENT DETECTION BY NUCLEIC ACID (DNA OR RNA); SEVERE ACUTE RESPIRATORY SYNDROME CORONAVIRUS 2 (SARS-COV-2) (CORONAVIRUS DISEASE [COVID-19]), AMPLIFIED PROBE TECHNIQUE, MAKING USE OF HIGH THROUGHPUT TECHNOLOGIES AS DESCRIBED BY CMS-2020-01-R: HCPCS | Performed by: PSYCHIATRY & NEUROLOGY

## 2022-02-23 RX ORDER — FINASTERIDE 5 MG/1
5 TABLET, FILM COATED ORAL DAILY
Qty: 30 TABLET | Refills: 0 | Status: SHIPPED | OUTPATIENT
Start: 2022-02-23

## 2022-02-23 RX ORDER — ROSUVASTATIN CALCIUM 40 MG/1
40 TABLET, COATED ORAL DAILY
Qty: 30 TABLET | Refills: 0 | Status: SHIPPED | OUTPATIENT
Start: 2022-02-23

## 2022-02-23 RX ORDER — METOPROLOL TARTRATE 25 MG/1
25 TABLET, FILM COATED ORAL 2 TIMES DAILY
Qty: 60 TABLET | Refills: 0 | Status: SHIPPED | OUTPATIENT
Start: 2022-02-23

## 2022-02-23 RX ORDER — MULTIPLE VITAMINS W/ MINERALS TAB 9MG-400MCG
1 TAB ORAL DAILY
Qty: 30 TABLET | Refills: 0 | Status: SHIPPED | OUTPATIENT
Start: 2022-02-24

## 2022-02-23 RX ORDER — LANOLIN ALCOHOL/MO/W.PET/CERES
100 CREAM (GRAM) TOPICAL DAILY
Qty: 30 TABLET | Refills: 0 | Status: SHIPPED | OUTPATIENT
Start: 2022-02-24

## 2022-02-23 RX ORDER — PHENOL 1.4 %
10 AEROSOL, SPRAY (ML) MUCOUS MEMBRANE
Qty: 30 TABLET | Refills: 0 | Status: SHIPPED | OUTPATIENT
Start: 2022-02-23

## 2022-02-23 RX ORDER — FUROSEMIDE 20 MG
40 TABLET ORAL DAILY
Qty: 30 TABLET | Refills: 0 | Status: SHIPPED | OUTPATIENT
Start: 2022-02-23

## 2022-02-23 RX ORDER — TAMSULOSIN HYDROCHLORIDE 0.4 MG/1
1.2 CAPSULE ORAL AT BEDTIME
Qty: 30 CAPSULE | Refills: 0 | Status: SHIPPED | OUTPATIENT
Start: 2022-02-23

## 2022-02-23 RX ORDER — CHOLECALCIFEROL (VITAMIN D3) 50 MCG
1 TABLET ORAL DAILY
Qty: 30 TABLET | Refills: 0 | Status: SHIPPED | OUTPATIENT
Start: 2022-02-23

## 2022-02-23 RX ORDER — TRAZODONE HYDROCHLORIDE 50 MG/1
50-100 TABLET, FILM COATED ORAL
Qty: 60 TABLET | Refills: 0 | Status: SHIPPED | OUTPATIENT
Start: 2022-02-23

## 2022-02-23 RX ORDER — POLYETHYLENE GLYCOL 3350 17 G
2-4 POWDER IN PACKET (EA) ORAL
Qty: 30 LOZENGE | Refills: 0 | Status: SHIPPED | OUTPATIENT
Start: 2022-02-23

## 2022-02-23 RX ADMIN — MULTIPLE VITAMINS W/ MINERALS TAB 1 TABLET: TAB at 08:48

## 2022-02-23 RX ADMIN — SERTRALINE HYDROCHLORIDE 150 MG: 50 TABLET ORAL at 08:47

## 2022-02-23 RX ADMIN — NICOTINE POLACRILEX 4 MG: 2 LOZENGE ORAL at 08:52

## 2022-02-23 RX ADMIN — METOPROLOL TARTRATE 25 MG: 25 TABLET, FILM COATED ORAL at 08:48

## 2022-02-23 RX ADMIN — Medication 50 MCG: at 08:48

## 2022-02-23 RX ADMIN — FINASTERIDE 5 MG: 5 TABLET, FILM COATED ORAL at 08:48

## 2022-02-23 RX ADMIN — FOLIC ACID 1 MG: 1 TABLET ORAL at 08:48

## 2022-02-23 RX ADMIN — THIAMINE HCL TAB 100 MG 100 MG: 100 TAB at 08:48

## 2022-02-23 RX ADMIN — ROSUVASTATIN CALCIUM 40 MG: 20 TABLET, FILM COATED ORAL at 08:47

## 2022-02-23 RX ADMIN — FUROSEMIDE 40 MG: 40 TABLET ORAL at 08:48

## 2022-02-23 RX ADMIN — ASPIRIN 81 MG: 81 TABLET, COATED ORAL at 08:48

## 2022-02-23 ASSESSMENT — ACTIVITIES OF DAILY LIVING (ADL)
HYGIENE/GROOMING: INDEPENDENT
ORAL_HYGIENE: INDEPENDENT
DRESS: INDEPENDENT
LAUNDRY: WITH SUPERVISION

## 2022-02-23 NOTE — PLAN OF CARE
Problem: Behavioral Health Plan of Care  Goal: Optimal Comfort and Wellbeing  Outcome: Ongoing, Progressing        Behavioral  Pt appeared sleeping comfortably overnight; breathing was even and unlabored.      Medical  Pt out of detox from alcohol.   No new medical concerns noted.

## 2022-02-23 NOTE — DISCHARGE SUMMARY
Erwin Devlin MRN# [id number]   Age: 71 year old YOB: 1950     Date of Admission:  2/20/2022  Date of Discharge:  2/23/2022  Admitting Physician:  Zita Currie MD  Discharge Physician:  Zita Currie MD      DISCHARGE  DX  Alcohol use disorder severe  Benzodiazepine withdrawal           Event Leading to Hospitalization:     See Admission note by admitting provider for patient encounter. for additional details.          Hospital Course:   PATIENT was [facility] to [facility] attending  under DR currie, please review the detailed admit note on 2/21/2022   The patient was placed under status 15 (15 minute checks) to ensure patient safety.   MSSA protocol was initiated due to the patient's history of alcohol abuse and concern for withdrawal symptoms.  CBC, BMP and utox obtained.  Patient was detox of temazepam using phenobarbital completed the taper  All outpatient medications were continued    PATIENTdid participate in groups and was visible in the milieu.     The patient's symptoms of alcohol withdrawal improved.     Patients energy motivation , sleep appetite improved.  Pt completed detox . It was un eventful.      Discussed with patient medications for craving.  Spoke with patient about triggers coping skills relapse prevention.    CONSULTS DONE DURING PATIENTS HOSPITALIZATION.  Patient was seen by medicine on date 2/21/2022    This as per their medical consult      LABORATORY DATA:  Urine drug screen positive for benzodiazepines.  Creatinine 1.94.  Otherwise, CBC and rest of comprehensive medical panel unremarkable.  Urinalysis unremarkable.  Alcohol level negative.  COVID testing negative.     IMPRESSION:  1.  Alcohol abuse and withdrawal per Dr. Currie.  2.  Chronic kidney disease, of which admission creatinine appears to be at baseline.  3.  Hypertension, stable, with current pressure is mildly elevated, most likely secondary to alcohol withdrawal and anxiety.  4.   History of coronary artery disease, status post stenting x2 approximately 6 years ago with recent cardiac workup including left heart catheterization unremarkable.  5.  History of dyspnea on exertion, likely due to deconditioned state as workup for underlying cardiac etiology as above negative.     PLAN:  His blood pressures will be monitored closely.  Continue with his multiple medications he takes for his multiple chronic medical problems, all of which are currently stable.  He should follow up with the family physician after discharge within 1-2 weeks for hospital followup and repeat labs including renal function, also for possible referral to Nephrology.  No further medical recommendations at this time.  The patient is medically stable and Medicine signing off.  Please feel free to call if questions.     Thank you for this consultation.        Pt was seen by cm  As per recommendations from cm  Writer faxed over signed KAY to [facility] at patient's request.      Writer spoke with Avni over at the [facility] who indicated that patient is aware that he cannot be on Temezepam or Phenobarbital taper when he admits there. Requested writer provide patient with a list of what to bring to treatment that he emailed to writer, and asked that CM/nursing keep [facility] updated on when patient's potential admit date might be. Avni clearly stated patient will need to come door to door from [facility].   Received call from Pt's wife.  Pt to be picked up by a relative tomorrow at 4 PM.  Provided number to unit and relative will call when in the [place].         Labs:reviewed with patient       Recent Results (from the past 48 hour(s))   Asymptomatic COVID-19 Virus (Coronavirus) by PCR Nasopharyngeal    Collection Time: 02/23/22 10:42 AM    Specimen: Nasopharyngeal; Swab   Result Value Ref Range    SARS CoV2 PCR Negative Negative         Recent Results (from the past 240 hour(s))   Comprehensive metabolic panel     Collection Time: 02/20/22  4:01 PM   Result Value Ref Range    Sodium 140 133 - 144 mmol/L    Potassium 4.0 3.4 - 5.3 mmol/L    Chloride 107 94 - 109 mmol/L    Carbon Dioxide (CO2) 28 20 - 32 mmol/L    Anion Gap 5 3 - 14 mmol/L    Urea Nitrogen 41 (H) 7 - 30 mg/dL    Creatinine 1.94 (H) 0.66 - 1.25 mg/dL    Calcium 9.4 8.5 - 10.1 mg/dL    Glucose 123 (H) 70 - 99 mg/dL    Alkaline Phosphatase 108 40 - 150 U/L    AST 35 0 - 45 U/L    ALT 43 0 - 70 U/L    Protein Total 7.0 6.8 - 8.8 g/dL    Albumin 3.3 (L) 3.4 - 5.0 g/dL    Bilirubin Total 0.6 0.2 - 1.3 mg/dL    GFR Estimate 37 (L) >60 mL/min/1.73m2   Asymptomatic COVID-19 Virus (Coronavirus) by PCR Nasopharyngeal    Collection Time: 02/20/22  4:01 PM    Specimen: Nasopharyngeal; Swab   Result Value Ref Range    SARS CoV2 PCR Negative Negative   CBC with platelets and differential    Collection Time: 02/20/22  4:01 PM   Result Value Ref Range    WBC Count 6.4 4.0 - 11.0 10e3/uL    RBC Count 4.31 (L) 4.40 - 5.90 10e6/uL    Hemoglobin 13.7 13.3 - 17.7 g/dL    Hematocrit 40.7 40.0 - 53.0 %    MCV 94 78 - 100 fL    MCH 31.8 26.5 - 33.0 pg    MCHC 33.7 31.5 - 36.5 g/dL    RDW 11.6 10.0 - 15.0 %    Platelet Count 233 150 - 450 10e3/uL    % Neutrophils 66 %    % Lymphocytes 19 %    % Monocytes 11 %    % Eosinophils 2 %    % Basophils 1 %    % Immature Granulocytes 1 %    NRBCs per 100 WBC 0 <1 /100    Absolute Neutrophils 4.3 1.6 - 8.3 10e3/uL    Absolute Lymphocytes 1.2 0.8 - 5.3 10e3/uL    Absolute Monocytes 0.7 0.0 - 1.3 10e3/uL    Absolute Eosinophils 0.1 0.0 - 0.7 10e3/uL    Absolute Basophils 0.1 0.0 - 0.2 10e3/uL    Absolute Immature Granulocytes 0.1 <=0.4 10e3/uL    Absolute NRBCs 0.0 10e3/uL   Alcohol level blood    Collection Time: 02/20/22  4:01 PM   Result Value Ref Range    Alcohol ethyl <0.01 <=0.01 g/dL   Extra Red Top Tube    Collection Time: 02/20/22  4:01 PM   Result Value Ref Range    Hold Specimen JIC    UA with Microscopic reflex to Culture    Collection  Time: 02/20/22  4:02 PM    Specimen: Urine, Clean Catch   Result Value Ref Range    Color Urine Light Yellow Colorless, Straw, Light Yellow, Yellow    Appearance Urine Clear Clear    Glucose Urine Negative Negative mg/dL    Bilirubin Urine Negative Negative    Ketones Urine Negative Negative mg/dL    Specific Gravity Urine 1.016 1.003 - 1.035    Blood Urine Trace (A) Negative    pH Urine 5.5 5.0 - 7.0    Protein Albumin Urine 30  (A) Negative mg/dL    Urobilinogen Urine Normal Normal, 2.0 mg/dL    Nitrite Urine Negative Negative    Leukocyte Esterase Urine Negative Negative    Mucus Urine Present (A) None Seen /LPF    RBC Urine <1 <=2 /HPF    WBC Urine <1 <=5 /HPF    Squamous Epithelials Urine <1 <=1 /HPF    Hyaline Casts Urine 1 <=2 /LPF   Drug abuse screen 1 urine (ED)    Collection Time: 02/20/22  4:02 PM   Result Value Ref Range    Amphetamines Urine Screen Negative Screen Negative    Barbiturates Urine Screen Negative Screen Negative    Benzodiazepines Urine Screen Positive (A) Screen Negative    Cannabinoids Urine Screen Negative Screen Negative    Cocaine Urine Screen Negative Screen Negative    Opiates Urine Screen Negative Screen Negative   GGT    Collection Time: 02/21/22  8:00 AM   Result Value Ref Range     (H) 0 - 75 U/L   TSH with free T4 reflex and/or T3 as indicated    Collection Time: 02/21/22  8:00 AM   Result Value Ref Range    TSH 3.68 0.40 - 4.00 mU/L   Lipid panel    Collection Time: 02/21/22  8:00 AM   Result Value Ref Range    Cholesterol 146 <200 mg/dL    Triglycerides 224 (H) <150 mg/dL    Direct Measure HDL 49 >=40 mg/dL    LDL Cholesterol Calculated 52 <=100 mg/dL    Non HDL Cholesterol 97 <130 mg/dL   Asymptomatic COVID-19 Virus (Coronavirus) by PCR Nasopharyngeal    Collection Time: 02/23/22 10:42 AM    Specimen: Nasopharyngeal; Swab   Result Value Ref Range    SARS CoV2 PCR Negative Negative            Because this patient meets criteria for an Alcohol Use Disorder, I performed  the following brief intervention on the date of this note:              1) Expressed concern that the patient is drinking at unhealthy levels known to increase their risk of alcohol related problems              2) Gave feedback linking alcohol use and health, including personalized feedback explaining how alcohol use can interact with their medical and/or psychiatric problems, and with prescribed medications.              3) Advised patient to abstain.    PT counseled on nicotine cessation and nicotine replacement provided    Discussed with patient many issues of addiction,triggers, relapse, and establishing a solid recovery program.    DISCHARGE MENTAL STATUS EXAMINATION:  The patient is alert, oriented x3.  Good fund of knowledge.  Good use of language.  Recent and remote memory, language, fund of knowledge are all adequate.  Euthymic mood congruent affect  Speech normal rate/rhythm linear tp no loose asso,The patient does not have any active suicidal or homicidal ideation.  Does not have any auditory or visual hallucination.  Fair insight/judgment At this time, the patient was stable to be discharged.        Pt was not determined to not be a danger to himself or others. At the current time of discharge, the patient does not meet criteria for involuntary hospitalization. On the day of discharge, the patient reports that they do not have suicidal or homicidal ideation and would never hurt themselves or others. Steps taken to minimize risk include: assessing patient s behavior and thought process daily during hospital stay, discharging patient with adequate plan for follow up for mental and physical health and discussing safety plan of returning to the hospital should the patient ever have thoughts of harming themselves or others. Therefore, based on all available evidence including the factors cited above, the patient does not appear to be at imminent risk for self-harm, and is appropriate for outpatient level of  care.     Educated about side effects/risk vs benefits /alternative including non treatment.Pt consented to be on medication.     .Total time spent on discharge summary more than 35 min  More than  20 min  planning, coordination of care, medication reconciliation and performance of physical exam on day of discharge.Care was coordinated with unit RN and unit therapist         Review of your medicines      START taking      Dose / Directions   Melatonin 10 MG Tabs tablet  Used for: Alcohol dependence with unspecified alcohol-induced disorder (H)      Dose: 10 mg  Take 1 tablet (10 mg) by mouth nightly as needed for sleep  Quantity: 30 tablet  Refills: 0     multivitamin w/minerals tablet  Used for: Alcohol dependence with unspecified alcohol-induced disorder (H)      Dose: 1 tablet  Start taking on: February 24, 2022  Take 1 tablet by mouth daily  Quantity: 30 tablet  Refills: 0     nicotine 2 MG lozenge  Commonly known as: COMMIT  Used for: Alcohol dependence with unspecified alcohol-induced disorder (H)      Dose: 2-4 mg  Place 1-2 lozenges (2-4 mg) inside cheek every hour as needed for other (nicotine withdrawal symptoms)  Quantity: 30 lozenge  Refills: 0     thiamine 100 MG tablet  Commonly known as: B-1  Used for: Alcohol dependence with unspecified alcohol-induced disorder (H)      Dose: 100 mg  Start taking on: February 24, 2022  Take 1 tablet (100 mg) by mouth daily  Quantity: 30 tablet  Refills: 0     traZODone 50 MG tablet  Commonly known as: DESYREL  Used for: Alcohol dependence with unspecified alcohol-induced disorder (H)      Dose:  mg  Take 1-2 tablets ( mg) by mouth nightly as needed for sleep  Quantity: 60 tablet  Refills: 0        CONTINUE these medicines which may have CHANGED, or have new prescriptions. If we are uncertain of the size of tablets/capsules you have at home, strength may be listed as something that might have changed.      Dose / Directions   sertraline 50 MG  tablet  Commonly known as: ZOLOFT  This may have changed: medication strength  Used for: Alcohol dependence with unspecified alcohol-induced disorder (H)      Dose: 150 mg  Start taking on: February 24, 2022  Take 3 tablets (150 mg) by mouth daily  Quantity: 90 tablet  Refills: 0        CONTINUE these medicines which have NOT CHANGED      Dose / Directions   aspirin 81 MG EC tablet  Commonly known as: ASA  Used for: Alcohol dependence with unspecified alcohol-induced disorder (H)      Dose: 81 mg  Take 1 tablet (81 mg) by mouth daily  Quantity: 30 tablet  Refills: 0     finasteride 5 MG tablet  Commonly known as: PROSCAR  Used for: Alcohol dependence with unspecified alcohol-induced disorder (H)      Dose: 5 mg  Take 1 tablet (5 mg) by mouth daily  Quantity: 30 tablet  Refills: 0     furosemide 20 MG tablet  Commonly known as: LASIX  Used for: Alcohol dependence with unspecified alcohol-induced disorder (H)      Dose: 40 mg  Take 2 tablets (40 mg) by mouth daily  Quantity: 30 tablet  Refills: 0     hydrocortisone-pramoxine rectal foam  Commonly known as: PROCTOFOAM-HC  Used for: Alcohol dependence with unspecified alcohol-induced disorder (H)      Dose: 1 Applicatorful  Place 1 Applicatorful rectally 3 times daily as needed  Quantity: 10 g  Refills: 0     metoprolol tartrate 25 MG tablet  Commonly known as: LOPRESSOR  Used for: Alcohol dependence with unspecified alcohol-induced disorder (H)      Dose: 25 mg  Take 1 tablet (25 mg) by mouth 2 times daily  Quantity: 60 tablet  Refills: 0     rosuvastatin 40 MG tablet  Commonly known as: CRESTOR  Used for: Alcohol dependence with unspecified alcohol-induced disorder (H)      Dose: 40 mg  Take 1 tablet (40 mg) by mouth daily  Quantity: 30 tablet  Refills: 0     tamsulosin 0.4 MG capsule  Commonly known as: FLOMAX  Used for: Alcohol dependence with unspecified alcohol-induced disorder (H)      Dose: 1.2 mg  Take 3 capsules (1.2 mg) by mouth At Bedtime  Quantity: 30  "capsule  Refills: 0     vitamin D3 50 mcg (2000 units) tablet  Commonly known as: CHOLECALCIFEROL  Used for: Alcohol dependence with unspecified alcohol-induced disorder (H)      Dose: 1 tablet  Take 1 tablet (50 mcg) by mouth daily  Quantity: 30 tablet  Refills: 0        STOP taking    temazepam 15 MG capsule  Commonly known as: RESTORIL              Where to get your medicines      These medications were sent to [facility] [address]  [address] [address]    Phone: [phone]     aspirin 81 MG EC tablet    finasteride 5 MG tablet    furosemide 20 MG tablet    hydrocortisone-pramoxine rectal foam    Melatonin 10 MG Tabs tablet    metoprolol tartrate 25 MG tablet    multivitamin w/minerals tablet    nicotine 2 MG lozenge    rosuvastatin 40 MG tablet    sertraline 50 MG tablet    tamsulosin 0.4 MG capsule    thiamine 100 MG tablet    traZODone 50 MG tablet    vitamin D3 50 mcg (2000 units) tablet          Disposition: The retreat     Facts about COVID19 at www.cdc.gov/COVID19 and www.[place].gov/covid19     Keeping hands clean is one of the most important steps we can take to avoid getting sick and spreading germs to others.  Please wash your hands frequently and lather with soap for at least 20 seconds!             \"Much or all of the text in this note was generated through the use of Dragon Dictate voice to text software. Errors in spelling or words which appear to be out of contact are unintentional, may be present due having escaped editing\"     "

## 2022-02-23 NOTE — PLAN OF CARE
Goal Outcome Evaluation:    Plan of Care Reviewed With: patient          Pt has completed alcohol detox. Pt receives scheduled phenobarb for benzodiazepine withdrawal.     Pt plan to discharge Wed 2/23 to [facility].    Pt visible in milieu and social with peers. Denied SI

## 2022-02-23 NOTE — PLAN OF CARE
Problem: Substance Withdrawal  Goal: Substance Withdrawal  Description: Signs and symptoms of listed problems will be absent or manageable.  Outcome: Adequate for Care Transition   Goal Outcome Evaluation:    Plan of Care Reviewed With: patient    Patient is discharged, instructions and medications read and explained to patient.     Patient  verbalized understanding and signed AVS.    Patient given all his belongings, escorted by Psych Associate Kvng at 1520 pm

## 2022-02-23 NOTE — PROGRESS NOTES
02/22/22 2200   Group Therapy Session   Group Attendance attended group session   Time Session Began 1645   Time Session Ended 1745   Total Time patient participated (minutes) 50   Total # Attendees 9   Group Type recreation   Group Topic Covered coping skills/lifestyle management   Patient Response/Contribution cooperative with task;discussed personal experience with topic     Pt participated in Therapeutic Recreation group with focus on leisure education and acquisition of knowledge and skills. Pt was fully engaged and cooperative in group recreational intervention; leisure inventory. Pt was focused on the written portion for the first part of group and then contributed to the group discussion following. Pt discussed several recreational interests and positive coping skills that are healthy outlets.